# Patient Record
Sex: MALE | URBAN - METROPOLITAN AREA
[De-identification: names, ages, dates, MRNs, and addresses within clinical notes are randomized per-mention and may not be internally consistent; named-entity substitution may affect disease eponyms.]

---

## 2022-01-01 ENCOUNTER — HOSPITAL ENCOUNTER (OUTPATIENT)
Dept: RADIOLOGY | Facility: MEDICAL CENTER | Age: 62
End: 2022-08-01
Payer: COMMERCIAL

## 2022-01-01 ENCOUNTER — APPOINTMENT (OUTPATIENT)
Dept: RADIOLOGY | Facility: MEDICAL CENTER | Age: 62
DRG: 871 | End: 2022-01-01
Attending: INTERNAL MEDICINE
Payer: COMMERCIAL

## 2022-01-01 ENCOUNTER — APPOINTMENT (OUTPATIENT)
Dept: RADIOLOGY | Facility: MEDICAL CENTER | Age: 62
DRG: 871 | End: 2022-01-01
Attending: STUDENT IN AN ORGANIZED HEALTH CARE EDUCATION/TRAINING PROGRAM
Payer: COMMERCIAL

## 2022-01-01 ENCOUNTER — APPOINTMENT (OUTPATIENT)
Dept: CARDIOLOGY | Facility: MEDICAL CENTER | Age: 62
DRG: 871 | End: 2022-01-01
Attending: STUDENT IN AN ORGANIZED HEALTH CARE EDUCATION/TRAINING PROGRAM
Payer: COMMERCIAL

## 2022-01-01 ENCOUNTER — APPOINTMENT (OUTPATIENT)
Dept: CARDIOLOGY | Facility: MEDICAL CENTER | Age: 62
DRG: 871 | End: 2022-01-01
Payer: COMMERCIAL

## 2022-01-01 ENCOUNTER — HOSPITAL ENCOUNTER (INPATIENT)
Facility: MEDICAL CENTER | Age: 62
LOS: 3 days | DRG: 871 | End: 2022-08-04
Attending: STUDENT IN AN ORGANIZED HEALTH CARE EDUCATION/TRAINING PROGRAM | Admitting: STUDENT IN AN ORGANIZED HEALTH CARE EDUCATION/TRAINING PROGRAM
Payer: COMMERCIAL

## 2022-01-01 VITALS
HEART RATE: 84 BPM | BODY MASS INDEX: 29.79 KG/M2 | OXYGEN SATURATION: 97 % | SYSTOLIC BLOOD PRESSURE: 96 MMHG | HEIGHT: 70 IN | DIASTOLIC BLOOD PRESSURE: 54 MMHG | WEIGHT: 208.11 LBS | TEMPERATURE: 96.8 F

## 2022-01-01 DIAGNOSIS — R65.21 SEPTIC SHOCK (HCC): ICD-10-CM

## 2022-01-01 DIAGNOSIS — G06.1 ABSCESS IN EPIDURAL SPACE OF THORACIC SPINE: ICD-10-CM

## 2022-01-01 DIAGNOSIS — A41.9 SEPTIC SHOCK (HCC): ICD-10-CM

## 2022-01-01 LAB
ALBUMIN SERPL BCP-MCNC: 1.7 G/DL (ref 3.2–4.9)
ALBUMIN SERPL BCP-MCNC: 2 G/DL (ref 3.2–4.9)
ALBUMIN/GLOB SERPL: 0.4 G/DL
ALBUMIN/GLOB SERPL: 0.4 G/DL
ALP SERPL-CCNC: 101 U/L (ref 30–99)
ALP SERPL-CCNC: 106 U/L (ref 30–99)
ALT SERPL-CCNC: 14 U/L (ref 2–50)
ALT SERPL-CCNC: 19 U/L (ref 2–50)
AMMONIA PLAS-SCNC: 58 UMOL/L (ref 11–45)
ANION GAP SERPL CALC-SCNC: 15 MMOL/L (ref 7–16)
ANION GAP SERPL CALC-SCNC: 15 MMOL/L (ref 7–16)
ANION GAP SERPL CALC-SCNC: 19 MMOL/L (ref 7–16)
ANION GAP SERPL CALC-SCNC: 19 MMOL/L (ref 7–16)
ANION GAP SERPL CALC-SCNC: 20 MMOL/L (ref 7–16)
ANISOCYTOSIS BLD QL SMEAR: ABNORMAL
APTT PPP: 36.3 SEC (ref 24.7–36)
AST SERPL-CCNC: 33 U/L (ref 12–45)
AST SERPL-CCNC: 44 U/L (ref 12–45)
BACTERIA BLD CULT: ABNORMAL
BASE EXCESS BLDA CALC-SCNC: -11 MMOL/L (ref -4–3)
BASE EXCESS BLDA CALC-SCNC: -4 MMOL/L (ref -4–3)
BASOPHILS # BLD AUTO: 0 % (ref 0–1.8)
BASOPHILS # BLD AUTO: 0.4 % (ref 0–1.8)
BASOPHILS # BLD AUTO: 0.6 % (ref 0–1.8)
BASOPHILS # BLD AUTO: 0.7 % (ref 0–1.8)
BASOPHILS # BLD: 0 K/UL (ref 0–0.12)
BASOPHILS # BLD: 0.07 K/UL (ref 0–0.12)
BASOPHILS # BLD: 0.13 K/UL (ref 0–0.12)
BASOPHILS # BLD: 0.16 K/UL (ref 0–0.12)
BILIRUB SERPL-MCNC: 1.6 MG/DL (ref 0.1–1.5)
BILIRUB SERPL-MCNC: 1.7 MG/DL (ref 0.1–1.5)
BODY TEMPERATURE: 36.6 CENTIGRADE
BODY TEMPERATURE: ABNORMAL DEGREES
BUN SERPL-MCNC: 54 MG/DL (ref 8–22)
BUN SERPL-MCNC: 60 MG/DL (ref 8–22)
BUN SERPL-MCNC: 70 MG/DL (ref 8–22)
BUN SERPL-MCNC: 80 MG/DL (ref 8–22)
BUN SERPL-MCNC: 94 MG/DL (ref 8–22)
BURR CELLS BLD QL SMEAR: NORMAL
CA-I SERPL-SCNC: 1.1 MMOL/L (ref 1.1–1.3)
CALCIUM SERPL-MCNC: 6.8 MG/DL (ref 8.5–10.5)
CALCIUM SERPL-MCNC: 6.8 MG/DL (ref 8.5–10.5)
CALCIUM SERPL-MCNC: 7.2 MG/DL (ref 8.5–10.5)
CALCIUM SERPL-MCNC: 7.3 MG/DL (ref 8.5–10.5)
CALCIUM SERPL-MCNC: 7.4 MG/DL (ref 8.5–10.5)
CFT BLD TEG: 7.2 MIN (ref 4.6–9.1)
CFT P HPASE BLD TEG: 6.5 MIN (ref 4.3–8.3)
CHLORIDE SERPL-SCNC: 100 MMOL/L (ref 96–112)
CHLORIDE SERPL-SCNC: 102 MMOL/L (ref 96–112)
CHLORIDE SERPL-SCNC: 97 MMOL/L (ref 96–112)
CHLORIDE SERPL-SCNC: 98 MMOL/L (ref 96–112)
CHLORIDE SERPL-SCNC: 99 MMOL/L (ref 96–112)
CK SERPL-CCNC: 45 U/L (ref 0–154)
CLOT ANGLE BLD TEG: 75.1 DEGREES (ref 63–78)
CO2 BLDA-SCNC: 17 MMOL/L (ref 20–33)
CO2 SERPL-SCNC: 12 MMOL/L (ref 20–33)
CO2 SERPL-SCNC: 13 MMOL/L (ref 20–33)
CO2 SERPL-SCNC: 15 MMOL/L (ref 20–33)
CO2 SERPL-SCNC: 15 MMOL/L (ref 20–33)
CO2 SERPL-SCNC: 16 MMOL/L (ref 20–33)
CORTIS SERPL-MCNC: 44.6 UG/DL (ref 0–23)
CREAT SERPL-MCNC: 1.26 MG/DL (ref 0.5–1.4)
CREAT SERPL-MCNC: 1.45 MG/DL (ref 0.5–1.4)
CREAT SERPL-MCNC: 1.81 MG/DL (ref 0.5–1.4)
CREAT SERPL-MCNC: 2.2 MG/DL (ref 0.5–1.4)
CREAT SERPL-MCNC: 2.96 MG/DL (ref 0.5–1.4)
CT.EXTRINSIC BLD ROTEM: 1.1 MIN (ref 0.8–2.1)
EKG IMPRESSION: NORMAL
EKG IMPRESSION: NORMAL
EOSINOPHIL # BLD AUTO: 0 K/UL (ref 0–0.51)
EOSINOPHIL # BLD AUTO: 0.02 K/UL (ref 0–0.51)
EOSINOPHIL # BLD AUTO: 0.02 K/UL (ref 0–0.51)
EOSINOPHIL # BLD AUTO: 0.04 K/UL (ref 0–0.51)
EOSINOPHIL NFR BLD: 0 % (ref 0–6.9)
EOSINOPHIL NFR BLD: 0.1 % (ref 0–6.9)
EOSINOPHIL NFR BLD: 0.1 % (ref 0–6.9)
EOSINOPHIL NFR BLD: 0.2 % (ref 0–6.9)
ERYTHROCYTE [DISTWIDTH] IN BLOOD BY AUTOMATED COUNT: 51.8 FL (ref 35.9–50)
ERYTHROCYTE [DISTWIDTH] IN BLOOD BY AUTOMATED COUNT: 53.8 FL (ref 35.9–50)
ERYTHROCYTE [DISTWIDTH] IN BLOOD BY AUTOMATED COUNT: 54.2 FL (ref 35.9–50)
ERYTHROCYTE [DISTWIDTH] IN BLOOD BY AUTOMATED COUNT: 54.5 FL (ref 35.9–50)
EST. AVERAGE GLUCOSE BLD GHB EST-MCNC: 163 MG/DL
FIBRINOGEN PPP-MCNC: 542 MG/DL (ref 215–460)
GFR SERPLBLD CREATININE-BSD FMLA CKD-EPI: 23 ML/MIN/1.73 M 2
GFR SERPLBLD CREATININE-BSD FMLA CKD-EPI: 33 ML/MIN/1.73 M 2
GFR SERPLBLD CREATININE-BSD FMLA CKD-EPI: 42 ML/MIN/1.73 M 2
GFR SERPLBLD CREATININE-BSD FMLA CKD-EPI: 55 ML/MIN/1.73 M 2
GFR SERPLBLD CREATININE-BSD FMLA CKD-EPI: 65 ML/MIN/1.73 M 2
GLOBULIN SER CALC-MCNC: 4.3 G/DL (ref 1.9–3.5)
GLOBULIN SER CALC-MCNC: 4.6 G/DL (ref 1.9–3.5)
GLUCOSE BLD STRIP.AUTO-MCNC: 116 MG/DL (ref 65–99)
GLUCOSE BLD STRIP.AUTO-MCNC: 125 MG/DL (ref 65–99)
GLUCOSE BLD STRIP.AUTO-MCNC: 146 MG/DL (ref 65–99)
GLUCOSE BLD STRIP.AUTO-MCNC: 151 MG/DL (ref 65–99)
GLUCOSE BLD STRIP.AUTO-MCNC: 171 MG/DL (ref 65–99)
GLUCOSE BLD STRIP.AUTO-MCNC: 171 MG/DL (ref 65–99)
GLUCOSE BLD STRIP.AUTO-MCNC: 173 MG/DL (ref 65–99)
GLUCOSE BLD STRIP.AUTO-MCNC: 186 MG/DL (ref 65–99)
GLUCOSE BLD STRIP.AUTO-MCNC: 190 MG/DL (ref 65–99)
GLUCOSE SERPL-MCNC: 104 MG/DL (ref 65–99)
GLUCOSE SERPL-MCNC: 142 MG/DL (ref 65–99)
GLUCOSE SERPL-MCNC: 170 MG/DL (ref 65–99)
GLUCOSE SERPL-MCNC: 189 MG/DL (ref 65–99)
GLUCOSE SERPL-MCNC: 206 MG/DL (ref 65–99)
HAV IGM SERPL QL IA: NORMAL
HBA1C MFR BLD: 7.3 % (ref 4–5.6)
HBV CORE IGM SER QL: NORMAL
HBV SURFACE AG SER QL: NORMAL
HCO3 BLDA-SCNC: 16 MMOL/L (ref 17–25)
HCO3 BLDA-SCNC: 19 MMOL/L (ref 17–25)
HCT VFR BLD AUTO: 38.4 % (ref 42–52)
HCT VFR BLD AUTO: 39.5 % (ref 42–52)
HCT VFR BLD AUTO: 40 % (ref 42–52)
HCT VFR BLD AUTO: 40.9 % (ref 42–52)
HCV AB SER QL: NORMAL
HGB BLD-MCNC: 13.2 G/DL (ref 14–18)
HGB BLD-MCNC: 13.2 G/DL (ref 14–18)
HGB BLD-MCNC: 13.4 G/DL (ref 14–18)
HGB BLD-MCNC: 13.4 G/DL (ref 14–18)
IMM GRANULOCYTES # BLD AUTO: 0.29 K/UL (ref 0–0.11)
IMM GRANULOCYTES # BLD AUTO: 0.3 K/UL (ref 0–0.11)
IMM GRANULOCYTES # BLD AUTO: 0.54 K/UL (ref 0–0.11)
IMM GRANULOCYTES NFR BLD AUTO: 1.4 % (ref 0–0.9)
IMM GRANULOCYTES NFR BLD AUTO: 1.6 % (ref 0–0.9)
IMM GRANULOCYTES NFR BLD AUTO: 2.3 % (ref 0–0.9)
INR PPP: 3.59 (ref 0.87–1.13)
INR PPP: 3.6 (ref 0.87–1.13)
LACTATE BLD-SCNC: 1.9 MMOL/L (ref 0.5–2)
LACTATE SERPL-SCNC: 2.2 MMOL/L (ref 0.5–2)
LACTATE SERPL-SCNC: 2.6 MMOL/L (ref 0.5–2)
LACTATE SERPL-SCNC: 2.9 MMOL/L (ref 0.5–2)
LACTATE SERPL-SCNC: 3 MMOL/L (ref 0.5–2)
LV EJECT FRACT  99904: 25
LV EJECT FRACT MOD 2C 99903: 45.85
LV EJECT FRACT MOD 4C 99902: 46.82
LV EJECT FRACT MOD BP 99901: 49.11
LYMPHOCYTES # BLD AUTO: 0 K/UL (ref 1–4.8)
LYMPHOCYTES # BLD AUTO: 0.41 K/UL (ref 1–4.8)
LYMPHOCYTES # BLD AUTO: 0.49 K/UL (ref 1–4.8)
LYMPHOCYTES # BLD AUTO: 0.53 K/UL (ref 1–4.8)
LYMPHOCYTES NFR BLD: 0 % (ref 22–41)
LYMPHOCYTES NFR BLD: 1.7 % (ref 22–41)
LYMPHOCYTES NFR BLD: 2.5 % (ref 22–41)
LYMPHOCYTES NFR BLD: 2.6 % (ref 22–41)
MACROCYTES BLD QL SMEAR: ABNORMAL
MAGNESIUM SERPL-MCNC: 2 MG/DL (ref 1.5–2.5)
MAGNESIUM SERPL-MCNC: 2.3 MG/DL (ref 1.5–2.5)
MANUAL DIFF BLD: NORMAL
MCF BLD TEG: 64.8 MM (ref 52–69)
MCF.PLATELET INHIB BLD ROTEM: 26.6 MM (ref 15–32)
MCH RBC QN AUTO: 27.1 PG (ref 27–33)
MCH RBC QN AUTO: 27.3 PG (ref 27–33)
MCH RBC QN AUTO: 27.5 PG (ref 27–33)
MCH RBC QN AUTO: 27.5 PG (ref 27–33)
MCHC RBC AUTO-ENTMCNC: 32.8 G/DL (ref 33.7–35.3)
MCHC RBC AUTO-ENTMCNC: 33.4 G/DL (ref 33.7–35.3)
MCHC RBC AUTO-ENTMCNC: 33.5 G/DL (ref 33.7–35.3)
MCHC RBC AUTO-ENTMCNC: 34.4 G/DL (ref 33.7–35.3)
MCV RBC AUTO: 80 FL (ref 81.4–97.8)
MCV RBC AUTO: 81.5 FL (ref 81.4–97.8)
MCV RBC AUTO: 82.3 FL (ref 81.4–97.8)
MCV RBC AUTO: 82.8 FL (ref 81.4–97.8)
MONOCYTES # BLD AUTO: 0.19 K/UL (ref 0–0.85)
MONOCYTES # BLD AUTO: 0.59 K/UL (ref 0–0.85)
MONOCYTES # BLD AUTO: 0.74 K/UL (ref 0–0.85)
MONOCYTES # BLD AUTO: 0.81 K/UL (ref 0–0.85)
MONOCYTES NFR BLD AUTO: 0.8 % (ref 0–13.4)
MONOCYTES NFR BLD AUTO: 3.1 % (ref 0–13.4)
MONOCYTES NFR BLD AUTO: 3.1 % (ref 0–13.4)
MONOCYTES NFR BLD AUTO: 3.9 % (ref 0–13.4)
MORPHOLOGY BLD-IMP: NORMAL
NEUTROPHILS # BLD AUTO: 17.63 K/UL (ref 1.82–7.42)
NEUTROPHILS # BLD AUTO: 19.19 K/UL (ref 1.82–7.42)
NEUTROPHILS # BLD AUTO: 21.8 K/UL (ref 1.82–7.42)
NEUTROPHILS # BLD AUTO: 23.81 K/UL (ref 1.82–7.42)
NEUTROPHILS NFR BLD: 91.5 % (ref 44–72)
NEUTROPHILS NFR BLD: 92.1 % (ref 44–72)
NEUTROPHILS NFR BLD: 92.1 % (ref 44–72)
NEUTROPHILS NFR BLD: 94.9 % (ref 44–72)
NEUTS BAND NFR BLD MANUAL: 4.3 % (ref 0–10)
NRBC # BLD AUTO: 0.04 K/UL
NRBC # BLD AUTO: 0.1 K/UL
NRBC # BLD AUTO: 0.11 K/UL
NRBC # BLD AUTO: 0.11 K/UL
NRBC BLD-RTO: 0.2 /100 WBC
NRBC BLD-RTO: 0.5 /100 WBC
NRBC BLD-RTO: 0.5 /100 WBC
NRBC BLD-RTO: 0.6 /100 WBC
PA AA BLD-ACNC: 48.3 % (ref 0–11)
PA ADP BLD-ACNC: 67.9 % (ref 0–17)
PCO2 BLDA: 28.2 MMHG (ref 26–37)
PCO2 BLDA: 37.9 MMHG (ref 26–37)
PCO2 TEMP ADJ BLDA: 36.1 MMHG (ref 26–37)
PH BLDA: 7.23 [PH] (ref 7.4–7.5)
PH BLDA: 7.44 [PH] (ref 7.4–7.5)
PH TEMP ADJ BLDA: 7.25 [PH] (ref 7.4–7.5)
PLATELET # BLD AUTO: 141 K/UL (ref 164–446)
PLATELET # BLD AUTO: 171 K/UL (ref 164–446)
PLATELET # BLD AUTO: 186 K/UL (ref 164–446)
PLATELET # BLD AUTO: 194 K/UL (ref 164–446)
PLATELET BLD QL SMEAR: NORMAL
PMV BLD AUTO: 10.2 FL (ref 9–12.9)
PMV BLD AUTO: 10.3 FL (ref 9–12.9)
PMV BLD AUTO: 10.8 FL (ref 9–12.9)
PMV BLD AUTO: 9.9 FL (ref 9–12.9)
PO2 BLDA: 83 MMHG (ref 64–87)
PO2 BLDA: 92 MMHG (ref 64–87)
PO2 TEMP ADJ BLDA: 86 MMHG (ref 64–87)
POIKILOCYTOSIS BLD QL SMEAR: NORMAL
POTASSIUM SERPL-SCNC: 3.9 MMOL/L (ref 3.6–5.5)
POTASSIUM SERPL-SCNC: 4.5 MMOL/L (ref 3.6–5.5)
POTASSIUM SERPL-SCNC: 4.5 MMOL/L (ref 3.6–5.5)
POTASSIUM SERPL-SCNC: 5.1 MMOL/L (ref 3.6–5.5)
POTASSIUM SERPL-SCNC: 5.3 MMOL/L (ref 3.6–5.5)
PROCALCITONIN SERPL-MCNC: 2.43 NG/ML
PROT SERPL-MCNC: 6 G/DL (ref 6–8.2)
PROT SERPL-MCNC: 6.6 G/DL (ref 6–8.2)
PROTHROMBIN TIME: 34.5 SEC (ref 12–14.6)
PROTHROMBIN TIME: 34.5 SEC (ref 12–14.6)
RBC # BLD AUTO: 4.8 M/UL (ref 4.7–6.1)
RBC # BLD AUTO: 4.8 M/UL (ref 4.7–6.1)
RBC # BLD AUTO: 4.91 M/UL (ref 4.7–6.1)
RBC # BLD AUTO: 4.94 M/UL (ref 4.7–6.1)
RBC BLD AUTO: PRESENT
SAO2 % BLDA: 95 % (ref 93–99)
SAO2 % BLDA: 96 % (ref 93–99)
SCCMEC + MECA PNL NOSE NAA+PROBE: POSITIVE
SIGNIFICANT IND 70042: ABNORMAL
SIGNIFICANT IND 70042: ABNORMAL
SITE SITE: ABNORMAL
SITE SITE: ABNORMAL
SODIUM SERPL-SCNC: 130 MMOL/L (ref 135–145)
SODIUM SERPL-SCNC: 132 MMOL/L (ref 135–145)
SODIUM SERPL-SCNC: 133 MMOL/L (ref 135–145)
SOURCE SOURCE: ABNORMAL
SOURCE SOURCE: ABNORMAL
SPECIMEN DRAWN FROM PATIENT: ABNORMAL
TEG ALGORITHM TGALG: ABNORMAL
TROPONIN T SERPL-MCNC: 63 NG/L (ref 6–19)
WBC # BLD AUTO: 19.1 K/UL (ref 4.8–10.8)
WBC # BLD AUTO: 21 K/UL (ref 4.8–10.8)
WBC # BLD AUTO: 23.7 K/UL (ref 4.8–10.8)
WBC # BLD AUTO: 24 K/UL (ref 4.8–10.8)

## 2022-01-01 PROCEDURE — 93005 ELECTROCARDIOGRAM TRACING: CPT | Performed by: STUDENT IN AN ORGANIZED HEALTH CARE EDUCATION/TRAINING PROGRAM

## 2022-01-01 PROCEDURE — 700101 HCHG RX REV CODE 250: Performed by: STUDENT IN AN ORGANIZED HEALTH CARE EDUCATION/TRAINING PROGRAM

## 2022-01-01 PROCEDURE — 99291 CRITICAL CARE FIRST HOUR: CPT | Mod: 25 | Performed by: INTERNAL MEDICINE

## 2022-01-01 PROCEDURE — 99291 CRITICAL CARE FIRST HOUR: CPT | Performed by: INTERNAL MEDICINE

## 2022-01-01 PROCEDURE — 700105 HCHG RX REV CODE 258: Performed by: INTERNAL MEDICINE

## 2022-01-01 PROCEDURE — 83605 ASSAY OF LACTIC ACID: CPT

## 2022-01-01 PROCEDURE — 700105 HCHG RX REV CODE 258

## 2022-01-01 PROCEDURE — 93010 ELECTROCARDIOGRAM REPORT: CPT | Performed by: INTERNAL MEDICINE

## 2022-01-01 PROCEDURE — 37799 UNLISTED PX VASCULAR SURGERY: CPT

## 2022-01-01 PROCEDURE — 92960 CARDIOVERSION ELECTRIC EXT: CPT

## 2022-01-01 PROCEDURE — 87641 MR-STAPH DNA AMP PROBE: CPT

## 2022-01-01 PROCEDURE — 99233 SBSQ HOSP IP/OBS HIGH 50: CPT | Performed by: INTERNAL MEDICINE

## 2022-01-01 PROCEDURE — 700111 HCHG RX REV CODE 636 W/ 250 OVERRIDE (IP)

## 2022-01-01 PROCEDURE — 700105 HCHG RX REV CODE 258: Performed by: STUDENT IN AN ORGANIZED HEALTH CARE EDUCATION/TRAINING PROGRAM

## 2022-01-01 PROCEDURE — 770022 HCHG ROOM/CARE - ICU (200)

## 2022-01-01 PROCEDURE — 85007 BL SMEAR W/DIFF WBC COUNT: CPT

## 2022-01-01 PROCEDURE — 700101 HCHG RX REV CODE 250: Performed by: INTERNAL MEDICINE

## 2022-01-01 PROCEDURE — 85384 FIBRINOGEN ACTIVITY: CPT

## 2022-01-01 PROCEDURE — 700111 HCHG RX REV CODE 636 W/ 250 OVERRIDE (IP): Performed by: INTERNAL MEDICINE

## 2022-01-01 PROCEDURE — 700111 HCHG RX REV CODE 636 W/ 250 OVERRIDE (IP): Performed by: STUDENT IN AN ORGANIZED HEALTH CARE EDUCATION/TRAINING PROGRAM

## 2022-01-01 PROCEDURE — 84484 ASSAY OF TROPONIN QUANT: CPT

## 2022-01-01 PROCEDURE — 99238 HOSP IP/OBS DSCHRG MGMT 30/<: CPT | Performed by: INTERNAL MEDICINE

## 2022-01-01 PROCEDURE — 02HV33Z INSERTION OF INFUSION DEVICE INTO SUPERIOR VENA CAVA, PERCUTANEOUS APPROACH: ICD-10-PCS | Performed by: STUDENT IN AN ORGANIZED HEALTH CARE EDUCATION/TRAINING PROGRAM

## 2022-01-01 PROCEDURE — C1751 CATH, INF, PER/CENT/MIDLINE: HCPCS

## 2022-01-01 PROCEDURE — A9270 NON-COVERED ITEM OR SERVICE: HCPCS

## 2022-01-01 PROCEDURE — 700102 HCHG RX REV CODE 250 W/ 637 OVERRIDE(OP)

## 2022-01-01 PROCEDURE — 85610 PROTHROMBIN TIME: CPT

## 2022-01-01 PROCEDURE — 80048 BASIC METABOLIC PNL TOTAL CA: CPT

## 2022-01-01 PROCEDURE — 82962 GLUCOSE BLOOD TEST: CPT

## 2022-01-01 PROCEDURE — 85730 THROMBOPLASTIN TIME PARTIAL: CPT

## 2022-01-01 PROCEDURE — 31720 CLEARANCE OF AIRWAYS: CPT

## 2022-01-01 PROCEDURE — 80053 COMPREHEN METABOLIC PANEL: CPT

## 2022-01-01 PROCEDURE — 76705 ECHO EXAM OF ABDOMEN: CPT

## 2022-01-01 PROCEDURE — 85025 COMPLETE CBC W/AUTO DIFF WBC: CPT

## 2022-01-01 PROCEDURE — 700117 HCHG RX CONTRAST REV CODE 255: Performed by: STUDENT IN AN ORGANIZED HEALTH CARE EDUCATION/TRAINING PROGRAM

## 2022-01-01 PROCEDURE — 36620 INSERTION CATHETER ARTERY: CPT | Performed by: INTERNAL MEDICINE

## 2022-01-01 PROCEDURE — 700111 HCHG RX REV CODE 636 W/ 250 OVERRIDE (IP): Performed by: NURSE PRACTITIONER

## 2022-01-01 PROCEDURE — A9270 NON-COVERED ITEM OR SERVICE: HCPCS | Performed by: STUDENT IN AN ORGANIZED HEALTH CARE EDUCATION/TRAINING PROGRAM

## 2022-01-01 PROCEDURE — 99292 CRITICAL CARE ADDL 30 MIN: CPT | Performed by: INTERNAL MEDICINE

## 2022-01-01 PROCEDURE — 85347 COAGULATION TIME ACTIVATED: CPT

## 2022-01-01 PROCEDURE — 87150 DNA/RNA AMPLIFIED PROBE: CPT | Mod: 91

## 2022-01-01 PROCEDURE — 82533 TOTAL CORTISOL: CPT

## 2022-01-01 PROCEDURE — 83735 ASSAY OF MAGNESIUM: CPT

## 2022-01-01 PROCEDURE — 36556 INSERT NON-TUNNEL CV CATH: CPT | Mod: RT | Performed by: STUDENT IN AN ORGANIZED HEALTH CARE EDUCATION/TRAINING PROGRAM

## 2022-01-01 PROCEDURE — 03HY32Z INSERTION OF MONITORING DEVICE INTO UPPER ARTERY, PERCUTANEOUS APPROACH: ICD-10-PCS | Performed by: INTERNAL MEDICINE

## 2022-01-01 PROCEDURE — 87186 SC STD MICRODIL/AGAR DIL: CPT

## 2022-01-01 PROCEDURE — 93306 TTE W/DOPPLER COMPLETE: CPT

## 2022-01-01 PROCEDURE — 82330 ASSAY OF CALCIUM: CPT

## 2022-01-01 PROCEDURE — 99255 IP/OBS CONSLTJ NEW/EST HI 80: CPT | Performed by: INTERNAL MEDICINE

## 2022-01-01 PROCEDURE — 82140 ASSAY OF AMMONIA: CPT

## 2022-01-01 PROCEDURE — 99152 MOD SED SAME PHYS/QHP 5/>YRS: CPT

## 2022-01-01 PROCEDURE — 85576 BLOOD PLATELET AGGREGATION: CPT

## 2022-01-01 PROCEDURE — 87077 CULTURE AEROBIC IDENTIFY: CPT

## 2022-01-01 PROCEDURE — 93306 TTE W/DOPPLER COMPLETE: CPT | Mod: 26 | Performed by: INTERNAL MEDICINE

## 2022-01-01 PROCEDURE — 700102 HCHG RX REV CODE 250 W/ 637 OVERRIDE(OP): Performed by: STUDENT IN AN ORGANIZED HEALTH CARE EDUCATION/TRAINING PROGRAM

## 2022-01-01 PROCEDURE — 83036 HEMOGLOBIN GLYCOSYLATED A1C: CPT

## 2022-01-01 PROCEDURE — 84145 PROCALCITONIN (PCT): CPT

## 2022-01-01 PROCEDURE — 71045 X-RAY EXAM CHEST 1 VIEW: CPT

## 2022-01-01 PROCEDURE — 99292 CRITICAL CARE ADDL 30 MIN: CPT | Mod: 25,GC | Performed by: STUDENT IN AN ORGANIZED HEALTH CARE EDUCATION/TRAINING PROGRAM

## 2022-01-01 PROCEDURE — 82550 ASSAY OF CK (CPK): CPT

## 2022-01-01 PROCEDURE — 82803 BLOOD GASES ANY COMBINATION: CPT

## 2022-01-01 PROCEDURE — 99291 CRITICAL CARE FIRST HOUR: CPT | Mod: GC | Performed by: STUDENT IN AN ORGANIZED HEALTH CARE EDUCATION/TRAINING PROGRAM

## 2022-01-01 PROCEDURE — 74176 CT ABD & PELVIS W/O CONTRAST: CPT

## 2022-01-01 PROCEDURE — 36556 INSERT NON-TUNNEL CV CATH: CPT

## 2022-01-01 PROCEDURE — 99223 1ST HOSP IP/OBS HIGH 75: CPT | Mod: AI,GC | Performed by: STUDENT IN AN ORGANIZED HEALTH CARE EDUCATION/TRAINING PROGRAM

## 2022-01-01 PROCEDURE — 80074 ACUTE HEPATITIS PANEL: CPT

## 2022-01-01 PROCEDURE — 93010 ELECTROCARDIOGRAM REPORT: CPT | Mod: 77 | Performed by: INTERNAL MEDICINE

## 2022-01-01 PROCEDURE — 99292 CRITICAL CARE ADDL 30 MIN: CPT | Mod: GC | Performed by: STUDENT IN AN ORGANIZED HEALTH CARE EDUCATION/TRAINING PROGRAM

## 2022-01-01 PROCEDURE — 87040 BLOOD CULTURE FOR BACTERIA: CPT | Mod: 91

## 2022-01-01 PROCEDURE — 700101 HCHG RX REV CODE 250

## 2022-01-01 RX ORDER — SODIUM CHLORIDE, SODIUM LACTATE, POTASSIUM CHLORIDE, AND CALCIUM CHLORIDE .6; .31; .03; .02 G/100ML; G/100ML; G/100ML; G/100ML
500 INJECTION, SOLUTION INTRAVENOUS ONCE
Status: COMPLETED | OUTPATIENT
Start: 2022-01-01 | End: 2022-01-01

## 2022-01-01 RX ORDER — MORPHINE SULFATE 4 MG/ML
4 INJECTION INTRAVENOUS EVERY 4 HOURS PRN
Status: DISCONTINUED | OUTPATIENT
Start: 2022-01-01 | End: 2022-01-01 | Stop reason: HOSPADM

## 2022-01-01 RX ORDER — PRAVASTATIN SODIUM 20 MG
40 TABLET ORAL EVERY EVENING
Status: DISCONTINUED | OUTPATIENT
Start: 2022-01-01 | End: 2022-01-01

## 2022-01-01 RX ORDER — HYDROMORPHONE HYDROCHLORIDE 2 MG/ML
2 INJECTION, SOLUTION INTRAMUSCULAR; INTRAVENOUS; SUBCUTANEOUS ONCE
Status: COMPLETED | OUTPATIENT
Start: 2022-01-01 | End: 2022-01-01

## 2022-01-01 RX ORDER — DEXTROSE MONOHYDRATE 25 G/50ML
25 INJECTION, SOLUTION INTRAVENOUS
Status: DISCONTINUED | OUTPATIENT
Start: 2022-01-01 | End: 2022-01-01

## 2022-01-01 RX ORDER — SODIUM CHLORIDE 9 MG/ML
INJECTION, SOLUTION INTRAVENOUS CONTINUOUS
Status: DISCONTINUED | OUTPATIENT
Start: 2022-01-01 | End: 2022-01-01

## 2022-01-01 RX ORDER — OXYCODONE HYDROCHLORIDE 5 MG/1
5 TABLET ORAL EVERY 4 HOURS PRN
Status: DISCONTINUED | OUTPATIENT
Start: 2022-01-01 | End: 2022-01-01 | Stop reason: HOSPADM

## 2022-01-01 RX ORDER — FUROSEMIDE 10 MG/ML
80 INJECTION INTRAMUSCULAR; INTRAVENOUS ONCE
Status: COMPLETED | OUTPATIENT
Start: 2022-01-01 | End: 2022-01-01

## 2022-01-01 RX ORDER — LACTULOSE 20 G/30ML
30 SOLUTION ORAL 3 TIMES DAILY
Status: DISCONTINUED | OUTPATIENT
Start: 2022-01-01 | End: 2022-01-01

## 2022-01-01 RX ORDER — POLYETHYLENE GLYCOL 3350 17 G/17G
1 POWDER, FOR SOLUTION ORAL
Status: DISCONTINUED | OUTPATIENT
Start: 2022-01-01 | End: 2022-01-01 | Stop reason: HOSPADM

## 2022-01-01 RX ORDER — AMOXICILLIN 250 MG
2 CAPSULE ORAL 2 TIMES DAILY
Status: DISCONTINUED | OUTPATIENT
Start: 2022-01-01 | End: 2022-01-01

## 2022-01-01 RX ORDER — NOREPINEPHRINE BITARTRATE 0.03 MG/ML
INJECTION, SOLUTION INTRAVENOUS
Status: COMPLETED
Start: 2022-01-01 | End: 2022-01-01

## 2022-01-01 RX ORDER — MIDODRINE HYDROCHLORIDE 5 MG/1
10 TABLET ORAL
Status: DISCONTINUED | OUTPATIENT
Start: 2022-01-01 | End: 2022-01-01

## 2022-01-01 RX ORDER — MIDAZOLAM HYDROCHLORIDE 1 MG/ML
INJECTION INTRAMUSCULAR; INTRAVENOUS
Status: ACTIVE
Start: 2022-01-01 | End: 2022-01-01

## 2022-01-01 RX ORDER — SODIUM CHLORIDE, SODIUM LACTATE, POTASSIUM CHLORIDE, AND CALCIUM CHLORIDE .6; .31; .03; .02 G/100ML; G/100ML; G/100ML; G/100ML
500 INJECTION, SOLUTION INTRAVENOUS ONCE
Status: DISCONTINUED | OUTPATIENT
Start: 2022-01-01 | End: 2022-01-01

## 2022-01-01 RX ORDER — FUROSEMIDE 10 MG/ML
100 INJECTION INTRAMUSCULAR; INTRAVENOUS ONCE
Status: COMPLETED | OUTPATIENT
Start: 2022-01-01 | End: 2022-01-01

## 2022-01-01 RX ORDER — SODIUM BICARBONATE 650 MG/1
650 TABLET ORAL EVERY 8 HOURS
Status: DISCONTINUED | OUTPATIENT
Start: 2022-01-01 | End: 2022-01-01

## 2022-01-01 RX ORDER — MIDAZOLAM HYDROCHLORIDE 1 MG/ML
2 INJECTION INTRAMUSCULAR; INTRAVENOUS ONCE
Status: COMPLETED | OUTPATIENT
Start: 2022-01-01 | End: 2022-01-01

## 2022-01-01 RX ORDER — ACETAMINOPHEN 325 MG/1
650 TABLET ORAL EVERY 4 HOURS PRN
Status: DISCONTINUED | OUTPATIENT
Start: 2022-01-01 | End: 2022-01-01

## 2022-01-01 RX ORDER — AMOXICILLIN 250 MG
2 CAPSULE ORAL 2 TIMES DAILY
Status: DISCONTINUED | OUTPATIENT
Start: 2022-01-01 | End: 2022-01-01 | Stop reason: HOSPADM

## 2022-01-01 RX ORDER — EPINEPHRINE HCL IN 0.9 % NACL 4MG/250ML
0-10 PLASTIC BAG, INJECTION (ML) INTRAVENOUS CONTINUOUS
Status: DISCONTINUED | OUTPATIENT
Start: 2022-01-01 | End: 2022-01-01

## 2022-01-01 RX ORDER — EPINEPHRINE HCL IN 0.9 % NACL 4MG/250ML
0-.2 PLASTIC BAG, INJECTION (ML) INTRAVENOUS CONTINUOUS
Status: DISCONTINUED | OUTPATIENT
Start: 2022-01-01 | End: 2022-01-01 | Stop reason: HOSPADM

## 2022-01-01 RX ORDER — OXYCODONE HYDROCHLORIDE 5 MG/1
5 TABLET ORAL EVERY 4 HOURS PRN
Status: DISCONTINUED | OUTPATIENT
Start: 2022-01-01 | End: 2022-01-01

## 2022-01-01 RX ORDER — BISACODYL 10 MG
10 SUPPOSITORY, RECTAL RECTAL
Status: DISCONTINUED | OUTPATIENT
Start: 2022-01-01 | End: 2022-01-01

## 2022-01-01 RX ORDER — MORPHINE SULFATE 4 MG/ML
2 INJECTION INTRAVENOUS EVERY 4 HOURS PRN
Status: DISCONTINUED | OUTPATIENT
Start: 2022-01-01 | End: 2022-01-01 | Stop reason: HOSPADM

## 2022-01-01 RX ORDER — ACETAMINOPHEN 325 MG/1
650 TABLET ORAL EVERY 4 HOURS PRN
Status: DISCONTINUED | OUTPATIENT
Start: 2022-01-01 | End: 2022-01-01 | Stop reason: HOSPADM

## 2022-01-01 RX ORDER — SODIUM CHLORIDE, SODIUM GLUCONATE, SODIUM ACETATE, POTASSIUM CHLORIDE AND MAGNESIUM CHLORIDE 526; 502; 368; 37; 30 MG/100ML; MG/100ML; MG/100ML; MG/100ML; MG/100ML
500 INJECTION, SOLUTION INTRAVENOUS ONCE
Status: COMPLETED | OUTPATIENT
Start: 2022-01-01 | End: 2022-01-01

## 2022-01-01 RX ORDER — BUDESONIDE AND FORMOTEROL FUMARATE DIHYDRATE 80; 4.5 UG/1; UG/1
2 AEROSOL RESPIRATORY (INHALATION)
Status: DISCONTINUED | OUTPATIENT
Start: 2022-01-01 | End: 2022-01-01 | Stop reason: HOSPADM

## 2022-01-01 RX ORDER — OMEPRAZOLE 20 MG/1
20 CAPSULE, DELAYED RELEASE ORAL DAILY
Status: DISCONTINUED | OUTPATIENT
Start: 2022-01-01 | End: 2022-01-01

## 2022-01-01 RX ORDER — NOREPINEPHRINE BITARTRATE 0.03 MG/ML
0-30 INJECTION, SOLUTION INTRAVENOUS CONTINUOUS
Status: DISCONTINUED | OUTPATIENT
Start: 2022-01-01 | End: 2022-01-01 | Stop reason: HOSPADM

## 2022-01-01 RX ORDER — POLYETHYLENE GLYCOL 3350 17 G/17G
1 POWDER, FOR SOLUTION ORAL
Status: DISCONTINUED | OUTPATIENT
Start: 2022-01-01 | End: 2022-01-01

## 2022-01-01 RX ORDER — BISACODYL 10 MG
10 SUPPOSITORY, RECTAL RECTAL
Status: DISCONTINUED | OUTPATIENT
Start: 2022-01-01 | End: 2022-01-01 | Stop reason: HOSPADM

## 2022-01-01 RX ORDER — EPINEPHRINE HCL IN 0.9 % NACL 4MG/250ML
0-.2 PLASTIC BAG, INJECTION (ML) INTRAVENOUS CONTINUOUS
Status: DISCONTINUED | OUTPATIENT
Start: 2022-01-01 | End: 2022-01-01

## 2022-01-01 RX ADMIN — NOREPINEPHRINE BITARTRATE 30 MCG/MIN: 1 INJECTION, SOLUTION, CONCENTRATE INTRAVENOUS at 18:56

## 2022-01-01 RX ADMIN — MORPHINE SULFATE 4 MG: 4 INJECTION INTRAVENOUS at 09:53

## 2022-01-01 RX ADMIN — MORPHINE SULFATE 2 MG: 4 INJECTION INTRAVENOUS at 21:28

## 2022-01-01 RX ADMIN — EPINEPHRINE 0.2 MCG/KG/MIN: 1 INJECTION INTRAMUSCULAR; INTRAVENOUS; SUBCUTANEOUS at 20:20

## 2022-01-01 RX ADMIN — EPINEPHRINE 0.12 MCG/KG/MIN: 1 INJECTION INTRAMUSCULAR; INTRAVENOUS; SUBCUTANEOUS at 07:23

## 2022-01-01 RX ADMIN — MIDODRINE HYDROCHLORIDE 10 MG: 5 TABLET ORAL at 00:14

## 2022-01-01 RX ADMIN — NOREPINEPHRINE BITARTRATE 22 MCG/MIN: 1 INJECTION, SOLUTION, CONCENTRATE INTRAVENOUS at 11:33

## 2022-01-01 RX ADMIN — SODIUM CHLORIDE, POTASSIUM CHLORIDE, SODIUM LACTATE AND CALCIUM CHLORIDE 500 ML: 600; 310; 30; 20 INJECTION, SOLUTION INTRAVENOUS at 00:35

## 2022-01-01 RX ADMIN — EPINEPHRINE 0.2 MCG/KG/MIN: 1 INJECTION INTRAMUSCULAR; INTRAVENOUS; SUBCUTANEOUS at 06:16

## 2022-01-01 RX ADMIN — INSULIN HUMAN 1 UNITS: 100 INJECTION, SOLUTION PARENTERAL at 05:44

## 2022-01-01 RX ADMIN — CHLOROTHIAZIDE SODIUM 500 MG: 500 INJECTION, POWDER, LYOPHILIZED, FOR SOLUTION INTRAVENOUS at 08:22

## 2022-01-01 RX ADMIN — NOREPINEPHRINE BITARTRATE 10 MCG/MIN: 1 INJECTION, SOLUTION, CONCENTRATE INTRAVENOUS at 01:12

## 2022-01-01 RX ADMIN — EPINEPHRINE 0.2 MCG/KG/MIN: 1 INJECTION INTRAMUSCULAR; INTRAVENOUS; SUBCUTANEOUS at 02:59

## 2022-01-01 RX ADMIN — FUROSEMIDE 80 MG: 10 INJECTION, SOLUTION INTRAMUSCULAR; INTRAVENOUS at 11:42

## 2022-01-01 RX ADMIN — FUROSEMIDE 100 MG: 10 INJECTION, SOLUTION INTRAMUSCULAR; INTRAVENOUS at 08:22

## 2022-01-01 RX ADMIN — EPINEPHRINE 1 MCG/MIN: 1 INJECTION INTRAMUSCULAR; INTRAVENOUS; SUBCUTANEOUS at 23:28

## 2022-01-01 RX ADMIN — CEFTRIAXONE SODIUM 2 G: 10 INJECTION, POWDER, FOR SOLUTION INTRAVENOUS at 11:25

## 2022-01-01 RX ADMIN — SODIUM CHLORIDE, POTASSIUM CHLORIDE, SODIUM LACTATE AND CALCIUM CHLORIDE 500 ML: 600; 310; 30; 20 INJECTION, SOLUTION INTRAVENOUS at 21:15

## 2022-01-01 RX ADMIN — EPINEPHRINE 0.2 MCG/KG/MIN: 1 INJECTION INTRAMUSCULAR; INTRAVENOUS; SUBCUTANEOUS at 21:58

## 2022-01-01 RX ADMIN — VANCOMYCIN HYDROCHLORIDE 750 MG: 500 INJECTION, POWDER, LYOPHILIZED, FOR SOLUTION INTRAVENOUS at 22:52

## 2022-01-01 RX ADMIN — EPINEPHRINE 6 MCG/MIN: 1 INJECTION INTRAMUSCULAR; INTRAVENOUS; SUBCUTANEOUS at 08:47

## 2022-01-01 RX ADMIN — VASOPRESSIN 0.03 UNITS/MIN: 20 INJECTION PARENTERAL at 01:06

## 2022-01-01 RX ADMIN — EPINEPHRINE 0.2 MCG/KG/MIN: 1 INJECTION INTRAMUSCULAR; INTRAVENOUS; SUBCUTANEOUS at 09:22

## 2022-01-01 RX ADMIN — INSULIN HUMAN 1 UNITS: 100 INJECTION, SOLUTION PARENTERAL at 00:04

## 2022-01-01 RX ADMIN — SODIUM CHLORIDE: 9 INJECTION, SOLUTION INTRAVENOUS at 02:18

## 2022-01-01 RX ADMIN — EPINEPHRINE 0.2 MCG/KG/MIN: 1 INJECTION INTRAMUSCULAR; INTRAVENOUS; SUBCUTANEOUS at 23:55

## 2022-01-01 RX ADMIN — OMEPRAZOLE 20 MG: 20 CAPSULE, DELAYED RELEASE ORAL at 07:31

## 2022-01-01 RX ADMIN — OXYCODONE 5 MG: 5 TABLET ORAL at 06:24

## 2022-01-01 RX ADMIN — NOREPINEPHRINE BITARTRATE 10 MCG/MIN: 1 INJECTION, SOLUTION, CONCENTRATE INTRAVENOUS at 01:07

## 2022-01-01 RX ADMIN — CEFTRIAXONE SODIUM 2 G: 10 INJECTION, POWDER, FOR SOLUTION INTRAVENOUS at 22:53

## 2022-01-01 RX ADMIN — EPINEPHRINE 0.14 MCG/KG/MIN: 1 INJECTION INTRAMUSCULAR; INTRAVENOUS; SUBCUTANEOUS at 13:56

## 2022-01-01 RX ADMIN — DAPTOMYCIN 750 MG: 500 INJECTION, POWDER, LYOPHILIZED, FOR SOLUTION INTRAVENOUS at 09:13

## 2022-01-01 RX ADMIN — HUMAN ALBUMIN MICROSPHERES AND PERFLUTREN 3 ML: 10; .22 INJECTION, SOLUTION INTRAVENOUS at 03:56

## 2022-01-01 RX ADMIN — EPINEPHRINE 0.2 MCG/KG/MIN: 1 INJECTION INTRAMUSCULAR; INTRAVENOUS; SUBCUTANEOUS at 20:10

## 2022-01-01 RX ADMIN — OXYCODONE 5 MG: 5 TABLET ORAL at 11:38

## 2022-01-01 RX ADMIN — MORPHINE SULFATE 4 MG: 4 INJECTION INTRAVENOUS at 01:32

## 2022-01-01 RX ADMIN — SODIUM CHLORIDE, SODIUM GLUCONATE, SODIUM ACETATE, POTASSIUM CHLORIDE AND MAGNESIUM CHLORIDE 500 ML: 526; 502; 368; 37; 30 INJECTION, SOLUTION INTRAVENOUS at 14:06

## 2022-01-01 RX ADMIN — HYDROMORPHONE HYDROCHLORIDE 2 MG: 2 INJECTION INTRAMUSCULAR; INTRAVENOUS; SUBCUTANEOUS at 16:43

## 2022-01-01 RX ADMIN — DAPTOMYCIN 750 MG: 500 INJECTION, POWDER, LYOPHILIZED, FOR SOLUTION INTRAVENOUS at 09:36

## 2022-01-01 RX ADMIN — EPINEPHRINE 0.12 MCG/KG/MIN: 1 INJECTION INTRAMUSCULAR; INTRAVENOUS; SUBCUTANEOUS at 19:30

## 2022-01-01 RX ADMIN — INSULIN HUMAN 1 UNITS: 100 INJECTION, SOLUTION PARENTERAL at 11:31

## 2022-01-01 RX ADMIN — VANCOMYCIN HYDROCHLORIDE 1750 MG: 500 INJECTION, POWDER, LYOPHILIZED, FOR SOLUTION INTRAVENOUS at 11:31

## 2022-01-01 RX ADMIN — MIDODRINE HYDROCHLORIDE 10 MG: 5 TABLET ORAL at 11:31

## 2022-01-01 RX ADMIN — MIDAZOLAM HYDROCHLORIDE 2 MG: 1 INJECTION, SOLUTION INTRAMUSCULAR; INTRAVENOUS at 16:56

## 2022-01-01 RX ADMIN — SODIUM BICARBONATE 50 MEQ: 84 INJECTION, SOLUTION INTRAVENOUS at 15:08

## 2022-01-01 RX ADMIN — MIDODRINE HYDROCHLORIDE 10 MG: 5 TABLET ORAL at 07:31

## 2022-01-01 ASSESSMENT — ENCOUNTER SYMPTOMS
DYSPNEA ON EXERTION: 0
LOSS OF CONSCIOUSNESS: 0
COUGH: 0
BLURRED VISION: 0
TINGLING: 0
PALPITATIONS: 0
WEAKNESS: 1
CONSTIPATION: 0
MEMORY LOSS: 0
MYALGIAS: 0
ABDOMINAL PAIN: 0
FOCAL WEAKNESS: 1
CHILLS: 0
HEADACHES: 0
EYE PAIN: 0
VOMITING: 0
FEVER: 0
DIZZINESS: 0
NAUSEA: 0
DIARRHEA: 0
BLOATING: 1
WHEEZING: 0
COUGH: 0
SHORTNESS OF BREATH: 0

## 2022-01-01 ASSESSMENT — PAIN DESCRIPTION - PAIN TYPE
TYPE: ACUTE PAIN

## 2022-01-01 ASSESSMENT — FIBROSIS 4 INDEX: FIB4 SCORE: 2.89

## 2022-08-01 PROBLEM — I51.3 MURAL THROMBUS OF HEART: Status: ACTIVE | Noted: 2022-01-01

## 2022-08-01 PROBLEM — E87.1 HYPO-OSMOLAR HYPONATREMIA: Status: ACTIVE | Noted: 2022-01-01

## 2022-08-01 PROBLEM — G06.2 EPIDURAL ABSCESS: Status: ACTIVE | Noted: 2022-01-01

## 2022-08-01 PROBLEM — J84.10 PULMONARY FIBROSIS (HCC): Status: ACTIVE | Noted: 2022-01-01

## 2022-08-01 PROBLEM — D64.9 NORMOCYTIC ANEMIA: Status: ACTIVE | Noted: 2022-01-01

## 2022-08-01 PROBLEM — G06.1 ABSCESS IN EPIDURAL SPACE OF THORACIC SPINE: Status: ACTIVE | Noted: 2022-01-01

## 2022-08-01 PROBLEM — E87.20 LACTIC ACIDOSIS: Status: ACTIVE | Noted: 2022-01-01

## 2022-08-01 PROBLEM — D50.9 MICROCYTIC ANEMIA: Status: RESOLVED | Noted: 2022-01-01 | Resolved: 2022-01-01

## 2022-08-01 PROBLEM — A41.9 SEPTIC SHOCK (HCC): Status: ACTIVE | Noted: 2022-01-01

## 2022-08-01 PROBLEM — R65.21 SEPTIC SHOCK (HCC): Status: ACTIVE | Noted: 2022-01-01

## 2022-08-01 PROBLEM — D50.9 MICROCYTIC ANEMIA: Status: ACTIVE | Noted: 2022-01-01

## 2022-08-01 PROBLEM — E11.9 T2DM (TYPE 2 DIABETES MELLITUS) (HCC): Status: ACTIVE | Noted: 2022-01-01

## 2022-08-02 PROBLEM — G93.40 ENCEPHALOPATHY: Status: ACTIVE | Noted: 2022-01-01

## 2022-08-02 PROBLEM — J90 PLEURAL EFFUSION, BILATERAL: Status: ACTIVE | Noted: 2022-01-01

## 2022-08-02 PROBLEM — R18.8 CIRRHOSIS OF LIVER WITH ASCITES (HCC): Status: ACTIVE | Noted: 2022-01-01

## 2022-08-02 PROBLEM — N17.9 AKI (ACUTE KIDNEY INJURY) (HCC): Status: ACTIVE | Noted: 2022-01-01

## 2022-08-02 PROBLEM — I50.20 HEART FAILURE WITH REDUCED EJECTION FRACTION (HCC): Status: ACTIVE | Noted: 2022-01-01

## 2022-08-02 PROBLEM — K74.60 CIRRHOSIS OF LIVER WITH ASCITES (HCC): Status: ACTIVE | Noted: 2022-01-01

## 2022-08-02 PROBLEM — Z71.89 GOALS OF CARE, COUNSELING/DISCUSSION: Status: ACTIVE | Noted: 2022-01-01

## 2022-08-02 PROBLEM — I33.0 ACUTE BACTERIAL ENDOCARDITIS: Status: ACTIVE | Noted: 2022-01-01

## 2022-08-02 NOTE — PROGRESS NOTES
"Pharmacy Vancomycin Kinetics Note for 8/2/2022     61 y.o. male on Vancomycin day # 1     Vancomycin Indication (Two level/Trough based Dosing): Sepsis (goal trough 15-20)    Provider specified end date: 08/15/22    Active Antibiotics (From admission, onward)    Ordered     Ordering Provider       e Aug 2, 2022  3:25 AM    08/02/22 0325  vancomycin (VANCOCIN) 1,750 mg in  mL IVPB  (vancomycin (VANCOCIN) IV (LD + Maintenance))  EVERY 12 HOURS         Santos Banda D.O.       Mon Aug 1, 2022 10:07 PM    08/01/22 2207  cefTRIAXone (Rocephin) syringe 2 g  EVERY 12 HOURS         Santos Banda D.O.       Mon Aug 1, 2022  9:20 PM    08/01/22 2120  MD Alert...Vancomycin per Pharmacy  PHARMACY TO DOSE        Question:  Indication(s) for vancomycin?  Answer:  Epidural abscess/vertebral osteomyelitis    Santos Bnada D.O.          Dosing Weight: 94.4 kg (208 lb 1.8 oz)      Admission History: Admitted on 8/1/2022 for Epidural abscess [G06.2]  Pertinent history: Pt presented to outside facility with back and RLE pain and was found to have an epidural abcess on MRI imaging. He was given 1500mg vancomycin and 4.5g of Zosyn prior to arrival. WBC upon transfer to [facility] were 24. Pt found to be tachypnic, hypotensive, and septic with infection secondary to the epidural abcess.    Allergies:     Patient has no allergy information on record.     Pertinent cultures to date:     Results     Procedure Component Value Units Date/Time    BLOOD CULTURE [[id number]] Collected: 08/01/22 2132    Order Status: Sent Specimen: Blood from Peripheral Updated: 08/01/22 2200    Narrative:      Per Hospital Policy: Only change Specimen Src: to \"Line\" if  specified by physician order.    BLOOD CULTURE [[id number]] Collected: 08/01/22 2121    Order Status: Sent Specimen: Blood from Peripheral Updated: 08/01/22 2200    Narrative:      Per Hospital Policy: Only change Specimen Src: to \"Line\" if  specified by physician order.    Blood " Culture [[id number]]     Order Status: No result Specimen: Blood from Peripheral     Blood Culture [[id number]]     Order Status: No result Specimen: Blood from Peripheral     MRSA By PCR (Amp) [[id number]]     Order Status: No result Specimen: Respirate from Nares           Labs:     CrCl cannot be calculated (Unknown ideal weight.).  Recent Labs     22   WBC 24.0*   NEUTSPOLYS 94.90*   BANDSSTABS 4.30     Recent Labs     22   BUN 54*   CREATININE 1.26   ALBUMIN 1.7*       Intake/Output Summary (Last 24 hours) at 2022 0353  Last data filed at 2022 0300  Gross per 24 hour   Intake 1528.91 ml   Output 450 ml   Net 1078.91 ml      /69   Pulse 85   Temp 36 °C (96.8 °F) (Temporal)   Resp (!) 27   Wt 94.4 kg (208 lb 1.8 oz)   SpO2 99%  Temp (24hrs), Av.2 °C (97.2 °F), Min:36 °C (96.8 °F), Max:36.6 °C (97.8 °F)      List concerns for Vancomycin clearance:     BUN/Scr ratio greater than 20:1;CHF;Pressors/Hypotension    Pharmacokinetics:     Trough kinetics:   No results for input(s): VANCOTROUGH, VANCOPEAK, VANCORANDOM in the last 72 hours.    A/P:     -  Vancomycin dose: 1750mg (~18mg/kg) q12h, starting  at 1100    -  Next vancomycin level(s): 2 days    -  Comments: Vanco ordered for sepsis secondary to epidural abscess. WBC on admit was 24, pt tachypnic with leukocytosis and hypotension. Pt received 1500mg initial loading dose at outside facility on  at 1453 and a second dose to complete it at [facility] on  at 2252. Laminectomy is being held for now due to high risk and current hemodynamic status. Renal function is stable. Floor pharmacist to follow and de-escalate if warranted.    Stephanie Staton, PharmD

## 2022-08-02 NOTE — PROCEDURES
Central Line Insertion    Date/Time: 8/2/2022 1:25 AM  Performed by: Julia Walker M.D.  Authorized by: Julia Walker M.D.     Consent:     Consent obtained:  Verbal    Consent given by:  Patient    Risks discussed:  Arterial puncture, incorrect placement, nerve damage, pneumothorax, infection and bleeding    Alternatives discussed:  No treatment, delayed treatment, alternative treatment and observation  Pre-procedure details:     Hand hygiene: Hand hygiene performed prior to insertion      Sterile barrier technique: All elements of maximal sterile technique followed      Skin preparation:  ChloraPrep    Skin preparation agent: Skin preparation agent completely dried prior to procedure    Anesthesia:     Anesthesia method:  Local infiltration    Local anesthetic:  Lidocaine 1% w/o epi  Procedure details:     Location:  R internal jugular    Patient position:  Reverse Trendelenburg    Procedural supplies:  Triple lumen    Catheter size:  7 Fr    Landmarks identified: yes      Ultrasound guidance: yes      Sterile ultrasound techniques: Sterile gel and sterile probe covers were used      Number of attempts:  1    Successful placement: yes    Post-procedure details:     Post-procedure:  Dressing applied and line sutured    Guidewire: guidewire removal confirmed      Assessment:  Blood return through all ports, no pneumothorax on x-ray, free fluid flow and placement verified by x-ray    Patient tolerance of procedure:  Tolerated well, no immediate complications

## 2022-08-02 NOTE — PROGRESS NOTES
Critical Care Progress Note    Date of admission  8/1/2022    Chief Complaint  61 y.o. male admitted 8/1/2022 with hx of HFrEF found to be 20-25%, ventricular thrombus on eliquis, pulmonary fibrosis on home oxygen, found to have epidural abscess T2-T9 at [facility] and transfer here for neurosurgical consultation was admitted to floor and transferred to ICU for acute management and BP augementation. Has a hx of prior spinal infection in 2019. Cardiology consulted s/p stat echo found to have endocarditis and vegetation on aortic valve with moderate AR.     Hospital Course  Admitted to ICU 8/1 for epidural abscess, sepsis and map augmentation    Interval Problem Update  Reviewed last 24 hour events:  Neuro:aox4 right lower 4/5 strength no pain  HR: 80-90's  SBP: 100-110's map goal change 70 epi 5 levo 20  Tmax: afebrile  GI: BM pta  UOP: 900ml jones  Lines: jones central line  Resp: 4l n/c   Vte: held   PPI/H2:ppi  Antibx: GPC in blood C3 and Vanco    Discuss with NSG and cardiology goal of BP augmentation changed to MAP >70 appreciate reconsideration  Stat CMP, ical now then BMP and lactate Q6  D/c NS  On midodrine 10mg Q8, vasopressin 0.03, norepi 10 and epi 6 to achieve map goal  CXR at 11 am reviewed  ID consultation Dr Kerr  INR 3.6 teg normal other then plat inhibition  CT abdomen for hip pain -> reviewed  Stopped vasopressin, changed to weight base dosing of epi gtt  Called family contact Ashley spoke with her and recommended against CPR or Intubation unless for procedure. Explained that I didn't think he was going to leave the hospital and very concerned with his trajectory -> she will discuss with rest of his family  Check ABG  Bicarb amp and bicarb tabs      Review of Systems  Review of Systems   Unable to perform ROS: Medical condition        Vital Signs for last 24 hours   Temp:  [36 °C (96.8 °F)-36.6 °C (97.8 °F)] 36 °C (96.8 °F)  Pulse:  [] 133  Resp:  [15-47] 15  BP: ()/(54-75)  98/55  SpO2:  [89 %-100 %] 98 %    Hemodynamic parameters for last 24 hours  CVP:  [18 MM HG-21 MM HG] 18 MM HG    Respiratory Information for the last 24 hours       Physical Exam   Physical Exam  Vitals and nursing note reviewed.   Constitutional:       General: He is not in acute distress.     Appearance: He is ill-appearing.      Comments: Sitting up in bed chronically ill appearing   HENT:      Head: Normocephalic.      Mouth/Throat:      Mouth: Mucous membranes are moist.   Eyes:      Pupils: Pupils are equal, round, and reactive to light.   Cardiovascular:      Rate and Rhythm: Tachycardia present. Rhythm irregular.      Heart sounds: No murmur heard.  Pulmonary:      Effort: No respiratory distress.      Breath sounds: No stridor. Rales present. No wheezing or rhonchi.      Comments: Rales 1/2 up on right and at base on left  Abdominal:      General: There is no distension.      Palpations: There is no mass.      Tenderness: There is no abdominal tenderness.      Hernia: No hernia is present.   Musculoskeletal:         General: Swelling present. No tenderness, deformity or signs of injury.      Comments: Pitting edema to sacrum and lower ext   Skin:     Coloration: Skin is pale.      Comments: Cold ext to all finger and toes and mottling to knees bilateral   Neurological:      Mental Status: He is alert.      Comments: Confused and not following commands, moves all extremities, no asterixis, protecting airway         Medications  Current Facility-Administered Medications   Medication Dose Route Frequency Provider Last Rate Last Admin   • norepinephrine (Levophed) 8 mg in 250 mL NS infusion (premix)  0-30 mcg/min Intravenous Continuous Pro Ballesteros M.D. 18.8 mL/hr at 08/02/22 1406 10 mcg/min at 08/02/22 1406   • insulin regular (HumuLIN R,NovoLIN R) injection  1-6 Units Subcutaneous Q6HRS Julia Walker M.D.   1 Units at 08/02/22 1131    And   • dextrose 50% (D50W) injection 25 g  25 g Intravenous Q15  MIN PRN Julia Walker M.D.       • oxyCODONE immediate-release (ROXICODONE) tablet 5 mg  5 mg Oral Q4HRS PRN Julia Walker M.D.   5 mg at 08/02/22 1138   • acetaminophen (Tylenol) tablet 650 mg  650 mg Oral Q4HRS PRN Julia Walker M.D.       • pravastatin (PRAVACHOL) tablet 40 mg  40 mg Oral Q EVENING Ruben Bradford M.D.       • vancomycin (VANCOCIN) 1,750 mg in  mL IVPB  1,750 mg Intravenous Q12HR MIGUEL Sevilla.O.   Stopped at 08/02/22 1331   • EPINEPHrine (Adrenalin) infusion 4 mg/250 mL (premix)  0-0.2 mcg/kg/min Intravenous Continuous Pro Ballesteros M.D. 35.4 mL/hr at 08/02/22 1245 0.1 mcg/kg/min at 08/02/22 1245   • sodium bicarbonate 8.4 % injection 50 mEq  50 mEq Intravenous Once Pro Ballesteros M.D.       • sodium bicarbonate tablet 650 mg  650 mg Oral Q8HRS Pro Ballesteros M.D.       • lactulose 20 GM/30ML solution 30 mL  30 mL Oral TID Pro Ballesteros M.D.       • MD Alert...Vancomycin per Pharmacy   Other PHARMACY TO DOSE MIGUEL Sevilla.O.       • senna-docusate (PERICOLACE or SENOKOT S) 8.6-50 MG per tablet 2 Tablet  2 Tablet Oral BID MIGUEL Sevilla.O.        And   • polyethylene glycol/lytes (MIRALAX) PACKET 1 Packet  1 Packet Oral QDAY PRN MIGUEL Sevilla.O.        And   • magnesium hydroxide (MILK OF MAGNESIA) suspension 30 mL  30 mL Oral QDAY PRN MIGUEL Sevilla.O.        And   • bisacodyl (DULCOLAX) suppository 10 mg  10 mg Rectal QDAY PRN Santos Banda D.O.       • cefTRIAXone (Rocephin) syringe 2 g  2 g Intravenous Q12HRS MIGUEL Sevilla.O.   2 g at 08/02/22 1125   • budesonide-formoterol (SYMBICORT) 80-4.5 MCG/ACT inhaler 2 Puff  2 Puff Inhalation BID (RT) Santos Banda D.O.       • midodrine (PROAMATINE) tablet 10 mg  10 mg Oral TID WITH MEALS Ruben Bradford M.D.   10 mg at 08/02/22 1131       Fluids    Intake/Output Summary (Last 24 hours) at 8/2/2022 1507  Last data filed at 8/2/2022 1200  Gross per 24 hour    Intake 2609.47 ml   Output 930 ml   Net 1679.47 ml       Laboratory  Recent Labs     08/01/22 2121 08/02/22  1414   AWPGS90S 7.44  --    NZSTNK350X 28.2  --    [id number] 83.0  --    OWLU1LOY 95.0  --    ARTHCO3 19  --    ARTBE -4  --    ISTATAPH  --  7.234*   ISTATAPCO2  --  37.9*   ISTATAPO2  --  92*   ISTATATCO2  --  17*   NSIUCOO0UYD  --  96   ISTATARTHCO3  --  16.0*   ISTATARTBE  --  -11*   ISTATTEMP  --  96.6 F   ISTATSPEC  --  Arterial   ISTATAPHTC  --  7.249*   [id number]  --  86         Recent Labs     08/01/22 2132 08/02/22  0600 08/02/22  1200   SODIUM 130* 132* 130*   POTASSIUM 3.9 4.5 4.5   CHLORIDE 99 98 97   CO2 16* 15* 13*   BUN 54* 60* 70*   CREATININE 1.26 1.45* 1.81*   MAGNESIUM 2.0 2.3  --    CALCIUM 7.3* 7.4* 7.2*     Recent Labs     08/01/22 2132 08/02/22  0600 08/02/22  1200   ALTSGPT 14 19  --    ASTSGOT 33 44  --    ALKPHOSPHAT 101* 106*  --    TBILIRUBIN 1.6* 1.7*  --    GLUCOSE 104* 142* 170*     Recent Labs     08/01/22 2121 08/01/22 2132 08/02/22  0600   WBC 24.0*  --   --    NEUTSPOLYS 94.90*  --   --    LYMPHOCYTES 0.00*  --   --    MONOCYTES 0.80  --   --    EOSINOPHILS 0.00  --   --    BASOPHILS 0.00  --   --    ASTSGOT  --  33 44   ALTSGPT  --  14 19   ALKPHOSPHAT  --  101* 106*   TBILIRUBIN  --  1.6* 1.7*     Recent Labs     08/01/22 2121 08/01/22 2132 08/02/22  0720   RBC 4.80  --   --    HEMOGLOBIN 13.2*  --   --    HEMATOCRIT 38.4*  --   --    PLATELETCT 186  --   --    PROTHROMBTM  --  34.5* 34.5*   APTT  --  36.3*  --    INR  --  3.60* 3.59*       Imaging  X-Ray:  I have personally reviewed the images and compared with prior images.  CT:    Reviewed  Echo:   Reviewed  MRI:   Reviewed    Assessment/Plan  * Abscess in epidural space of thoracic spine- (present on admission)  Assessment & Plan  Neurosurgery consulting patient is poor operative candidate  Medical management   ID consulting  Serial neurologic exam for worsening spinal cord compression  Map goal of > 70  after discussing with NSG, cardiology and to maximize perfusion to spinal cord with his poor LV and Mod AR  No IR intervention        Goals of care, counseling/discussion  Assessment & Plan  Called spoke with family 8/2 patient has a very concerning diagnosis and multiple comorbid conditions and unlikely to survive hospital discharge  Recommended against CPR and short intubation limit trial. Ashley daughter appreciate discussion and will discuss with family about patient wishes since he is unable to help us at this time with his encephalopathy    Palliative care consulted   Continue good communication with family and update them on patient trajectory    Pleural effusion, bilateral  Assessment & Plan  Likely chronic due to heart failure  Monitor need for diagnostic and or drainage    JOANA (acute kidney injury) (HCC)  Assessment & Plan  Maintain euvolemia and monitor fluid responsiveness (avoid NaCL and renal congestion)  MAP > 70 uses pressors or inotropic trial  Monitor urine output and I&O's  Avoid and review nephrotoxin medication  Rule out post obstruction -> ct abdomen negative  Consider renal U/S if no renal images  U/a and CPK    Careful volume expansion    Cirrhosis of liver with ascites (HCC)  Assessment & Plan  T Bili 1.7, INR 3.5 with ascites on ct scan  Unknown alcohol hx  Check hepatitis panel  Avoid hepatoxins  Serial monitor consider lactulose  Possible cardiac in etiology    Encephalopathy  Assessment & Plan  Metabolic, septic and cardiogenic in nature  Limit sedative  Aspiration precautions  Monitor need for intubation  Consider lactulose if worsening for possible hepatic encephalopathy with chronic liver disease    Acute bacterial endocarditis  Assessment & Plan  Vegetation seen on AV  With GPC bacteremia  ID consultation  Serial blood cx      Heart failure with reduced ejection fraction (HCC)  Assessment & Plan  EF 20% with asymmetric wall motions likely ischemic disease  Mod AR  Continue inotrope w/  epinephrine to support MAP and HR to 's and to prevent regurgitation of AR   Limit pure afterload agents   Use norepinephrine for map goal in addition to epi as above  Try to limit fluids and is already volume overloaded with ascites and pleural effusions    Prognosis poor palliative care consultation  Not a cardiac cath candidate with his potential surgical needs    Mural thrombus of heart- (present on admission)  Assessment & Plan  Chronic in nature holding anticoagulation with potential need for operative intervention  Restart when able  INR 3.5 with stable TEG   Due to his liver dysfunction  Eliquis was not reversed    Septic shock (HCC)- (present on admission)  Assessment & Plan  This is Septic shock Present on admission  SIRS criteria identified on my evaluation include: Tachycardia, with heart rate greater than 90 BPM and Leukocytosis, with WBC greater than 12,000  Indicators of septic shock include: Severe sepsis present and persistent hypotension after 30 ml/kg completed.   Sources is: bacteremia, epidural abscess and endocarditis  Sepsis protocol initiated  Crystalloid Fluid Administration: Patient has advanced or end-stage heart failure (with documentation of NYHA class III or symptoms with minimal exertion, Or NYHA class IV or symptoms at rest or with activity), for this/these reason(s) it is unsafe for this patient to receive 30 mL/kg of fluid  Partial Fluid Dose Given: 10 mL/kg per current or ideal body weight, patient to be reassessed shortly after completion of partial fluid bolus to ensure adequacy of resuscitation  IV antibiotics as appropriate for source of sepsis  Reassessment: I have reassessed the patient's hemodynamic status    ID consultation   Poor surgical candidate thus source control will be difficult      T2DM (type 2 diabetes mellitus) (HCC)- (present on admission)  Assessment & Plan  Tight glucose control to help promote infection control  Sliding scale coverage with hypoglycemic  protocol  HgA1C    Pulmonary fibrosis (HCC)- (present on admission)  Assessment & Plan  Hx of which make even harder due to his poor pulmonary reserve  Careful with volume expansion  Monitor need for mechanical support with Bipap or Intubation       VTE:  Contraindicated and SCD's  Ulcer: Not Indicated  Lines: Central Line  Ongoing indication addressed, Arterial Line  Ongoing indication addressed and Rodriguez Catheter  Ongoing indication addressed    I have performed a physical exam and reviewed and updated ROS and Plan today (8/2/2022). In review of yesterday's note (8/1/2022), there are no changes except as documented above.     Discussed patient condition and risk of morbidity and/or mortality with Family, RN, RT, Pharmacy, Code status disscussed, Charge nurse / hot rounds, Patient and cardiology, infectious disease and neurosurgery    The patient remains critically ill on vasopressin, norepinephrine, epinephrine and close neuromonitoring.  Critical care time = 125 minutes in directly providing and coordinating critical care and extensive data review.  No time overlap and excludes procedures.

## 2022-08-02 NOTE — PROGRESS NOTES
[facility]PITAL[facility] TRIAGE OFFICER DIRECT ADMISSION REPORT  Transferring facility: [facility]   Transferring physician: Bronson Guillermo   Transferring facility/physician contact number: [facility]   Chief complaint: back pain   Pertinent history & patient course: 60 yo male with past medical history of CHF with LV mural thrombus EF 20 to 25% on Eliquis, pulmonary fibrosis on home oxygen history of spinal infection 2019 hospitalized in [place] with IV antibiotics.  He presented to the ER with back pain found to have WBC count 23,000.  MRI done showed epidural abscess T2 T6 and T6-T9.  He was given broad-spectrum IV antibiotics.  Transferring provider reports no focal neurological deficits, no paresthesias, and no signs of cord compression.  Requesting higher level of care for neurosurgery consultation.    Pertinent imaging & lab results:  BMP 30 K, lactate 1.5, INR 2.9 (on Eliquis)  Code Status:  Full code  Further work up or recommendations per triage officer prior to transfer:  Send MRI images electronically  Consultants called prior to transfer and pertinent input from consultants: None  Patient accepted for transfer: Yes  Consultants to be called upon arrival:  Neurosurgery  Admission status: Inpatient.   Floor requested:  Telemetry  If ICU transfer, name of intensivist case discussed with and pertinent input from critical care:  None     Please inform the triage officer upon arrival of the patient to [facility] for assignment of a hospitalist to perform admission.      For any question or concerns regarding the care of this patient, please reach out to the assigned hospitalist.

## 2022-08-02 NOTE — ASSESSMENT & PLAN NOTE
Chronic in nature holding anticoagulation with potential need for operative intervention  Restart when able  INR 3.5 with stable TEG likely related to liver disease  Eliquis was not reversed on admission

## 2022-08-02 NOTE — PROGRESS NOTES
Brief follow up note:    Patient awake and responds to questions but appears to be altered and encephalopathic without being able to obtain meaningful answers from him. [place]. Does not appear to be in respiratory distress. Lying flat in bed without tachypnea. Bibasilar decreased air entry with fine rales. 2-3+ bilateral pitting edema (mainly around his ankles) with cold extremities. See NSG note on Neuro exam.    I personally reviewed his TTE images and discussed his progress with the Intensivist Dr. Ballesteros, the Neurosurgeon Dr. Wagner and NP Julia, and his RN.    MAPs have been maintained 80-85mmHg to ensure continued spine perfusion given his epidural abscess with mild motor function deficit.    His TTE findings of LV thrombus and signs of ischemic CM with his EG findings appear to be old in nature. ECG with normal MN interval.    Telemetry review does not reval any A fib or flutter at this time, some PACs and PVCs, in sinus with BBB.    Recommendations:     # septic shock  # Bacteremia GPCs  # epidural abscess  # AV endocarditis with moderate AR  # Layered LV apical thrombus  # Ischemic Cardiomyopathy  # Acute on ? chronic systolic heart failure  # Elevated troponin T in the 60's  # JOANA  # Hyponatremia  # Lactic acidosis with metabolic acidosis  # Encephalopathy    - Target MAP 70mmHg (discussed with NSG) for now. For his cardiomyopathy, it is ideal to be closer to 60-65mmHg.  - wean off pressors as tolerated, starting with vasopressin then epinephrine before deciding on milrinone (dobutamine on national shortage). Avoid BB or CCB at this time.  - stop midodrine  - consider IV diuresis to target at least net -1L/24 hours if possible  - Defer timing of surgery to NSG, he is at elevated risk.  - ongoing antibiotics  - Full dose AC and / or Aspirin 81mg once deemed safe by NSG.   - HI statin, target LDL < 70  - No clear benefit for MARISA at this time unless ID believes it will add value to his workup and prognosis.  Does not appear to be a surgical candidate at this time  - consider palliative care involvement.    No family contacts in his Epic chart for me to discuss the above and obtain more history.    Godwin Albright MD, MPH [facility]  Interventional Cardiologist  [facility]   of Clinical Internal Medicine - [facility]

## 2022-08-02 NOTE — CONSULTS
Consults   INFECTIOUS DISEASES INPATIENT CONSULT NOTE     Date of Service: 8/2/2022    Consult Requested By: Pro Ballesteros M.D.    Reason for Consultation: Atrial valve endocarditis, epidural abscess of thoracic spine    History of Present Illness:   Ramy Clark is a 61 y.o.  admitted 8/1/2022. Pt has a past medical history of HFrEF 20-25%, ventricular thrombus on Eliquis and pulm fibrosis on home oxygen.  Initially seen at [facility] where per notes he was found to have an epidural abscess at T2/T9 and transferred for neurosurgical consultation.  Neurosurgery evaluating and no plans for emergency surgery will continue to follow.  Echocardiogram has been done with finding of vegetation on the aortic valve.  Patient has been admitted to the ICU and continues to require pressor support.    Review Of Systems:  Review of Systems   Unable to perform ROS: Mental status change       PMH:   No past medical history on file.    PSH:  No past surgical history on file.    FAMILY HX:  No family history on file.  Reviewed family history. No pertinent family history.     SOCIAL HX:  Social History     Socioeconomic History   • Marital status: Not on file     Spouse name: Not on file   • Number of children: Not on file   • Years of education: Not on file   • Highest education level: Not on file   Occupational History   • Not on file   Tobacco Use   • Smoking status: Not on file   • Smokeless tobacco: Not on file   Substance and Sexual Activity   • Alcohol use: Not on file   • Drug use: Not on file   • Sexual activity: Not on file   Other Topics Concern   • Not on file   Social History Narrative   • Not on file     Social Determinants of Health     Financial Resource Strain: Not on file   Food Insecurity: Not on file   Transportation Needs: Not on file   Physical Activity: Not on file   Stress: Not on file   Social Connections: Not on file   Intimate Partner Violence: Not on file   Housing Stability: Not on file     Social  History     Tobacco Use   Smoking Status Not on file   Smokeless Tobacco Not on file     Social History     Substance and Sexual Activity   Alcohol Use Not on file       Allergies/Intolerances:  No Known Allergies    History reviewed per chart     Other Current Medications:    Current Facility-Administered Medications:   •  vasopressin (VASOSTRICT) 20 Units in  mL Infusion, 0.03 Units/min, Intravenous, Continuous, Ruben Bradford M.D., Stopped at 08/02/22 0931  •  norepinephrine (Levophed) 8 mg in 250 mL NS infusion (premix), 0-30 mcg/min, Intravenous, Continuous, Pro Ballesteros M.D., Last Rate: 37.5 mL/hr at 08/02/22 1151, 20 mcg/min at 08/02/22 1151  •  insulin regular (HumuLIN R,NovoLIN R) injection, 1-6 Units, Subcutaneous, Q6HRS, 1 Units at 08/02/22 1131 **AND** POC blood glucose manual result, , , Q6H **AND** NOTIFY MD and PharmD, , , Once **AND** Administer 20 grams of glucose (approximately 8 ounces of fruit juice) every 15 minutes PRN FSBG less than 70 mg/dL, , , PRN **AND** dextrose 50% (D50W) injection 25 g, 25 g, Intravenous, Q15 MIN PRN, Julia Walker M.D.  •  oxyCODONE immediate-release (ROXICODONE) tablet 5 mg, 5 mg, Oral, Q4HRS PRN, Julia Walker M.D., 5 mg at 08/02/22 1138  •  acetaminophen (Tylenol) tablet 650 mg, 650 mg, Oral, Q4HRS PRN, Julia Walker M.D.  •  pravastatin (PRAVACHOL) tablet 40 mg, 40 mg, Oral, Q EVENING, Ruben Bradford M.D.  •  vancomycin (VANCOCIN) 1,750 mg in  mL IVPB, 1,750 mg, Intravenous, Q12HR, Santos Banda D.O., Last Rate: 250 mL/hr at 08/02/22 1131, 1,750 mg at 08/02/22 1131  •  EPINEPHrine (Adrenalin) infusion 4 mg/250 mL (premix), 0-0.2 mcg/kg/min, Intravenous, Continuous, Pro Ballesteros M.D., Last Rate: 35.4 mL/hr at 08/02/22 1245, 0.1 mcg/kg/min at 08/02/22 1245  •  MD Alert...Vancomycin per Pharmacy, , Other, PHARMACY TO DOSE, Santos Banda D.O.  •  senna-docusate (PERICOLACE or SENOKOT S) 8.6-50 MG per tablet 2  Tablet, 2 Tablet, Oral, BID **AND** polyethylene glycol/lytes (MIRALAX) PACKET 1 Packet, 1 Packet, Oral, QDAY PRN **AND** magnesium hydroxide (MILK OF MAGNESIA) suspension 30 mL, 30 mL, Oral, QDAY PRN **AND** bisacodyl (DULCOLAX) suppository 10 mg, 10 mg, Rectal, QDAY PRN, Santos Banda D.O.  •  cefTRIAXone (Rocephin) syringe 2 g, 2 g, Intravenous, Q12HRS, MIGUEL Sevilla.O., 2 g at 22 1125  •  budesonide-formoterol (SYMBICORT) 80-4.5 MCG/ACT inhaler 2 Puff, 2 Puff, Inhalation, BID (RT), Santos Banda D.O.  •  midodrine (PROAMATINE) tablet 10 mg, 10 mg, Oral, TID WITH MEALS, Ruben Bradford M.D., 10 mg at 22 1131  [unfilled]    Most Recent Vital Signs:  BP (!) 98/55   Pulse (!) 111   Temp 36 °C (96.8 °F) (Temporal)   Resp 15   Wt 94.4 kg (208 lb 1.8 oz)   SpO2 99%   Temp  Av.2 °C (97.2 °F)  Min: 36 °C (96.8 °F)  Max: 36.6 °C (97.8 °F)    Physical Exam:  Physical Exam  Constitutional:       Appearance: Normal appearance.   HENT:      Head: Normocephalic and atraumatic.      Right Ear: External ear normal.      Left Ear: External ear normal.      Nose: Nose normal.      Mouth/Throat:      Mouth: Mucous membranes are moist.      Pharynx: Oropharynx is clear.   Eyes:      Extraocular Movements: Extraocular movements intact.      Conjunctiva/sclera: Conjunctivae normal.      Pupils: Pupils are equal, round, and reactive to light.   Cardiovascular:      Rate and Rhythm: Tachycardia present. Rhythm irregular.      Heart sounds: Murmur heard.   Pulmonary:      Effort: Pulmonary effort is normal.      Breath sounds: Normal breath sounds.   Abdominal:      General: Abdomen is flat. Bowel sounds are normal.      Palpations: Abdomen is soft.   Musculoskeletal:         General: Normal range of motion.      Cervical back: Normal range of motion.   Skin:     General: Skin is warm and dry.   Neurological:      Comments: Moving extremities, generally confused   Psychiatric:          "Mood and Affect: Mood normal.             Pertinent Lab Results:  Recent Labs     08/01/22 2121   WBC 24.0*      Recent Labs     08/01/22 2121   HEMOGLOBIN 13.2*   HEMATOCRIT 38.4*   MCV 80.0*   MCH 27.5   MACROCYTOSIS 1+   ANISOCYTOSIS 1+   PLATELETCT 186         Recent Labs     08/01/22 2132 08/02/22  0600 08/02/22  1200   SODIUM 130* 132* 130*   POTASSIUM 3.9 4.5 4.5   CHLORIDE 99 98 97   CO2 16* 15* 13*   CREATININE 1.26 1.45* 1.81*        Recent Labs     08/01/22 2132 08/02/22  0600   ALBUMIN 1.7* 2.0*        Pertinent Micro:  Results     Procedure Component Value Units Date/Time    MRSA By PCR (Amp) [[id number]] Collected: 08/02/22 0730    Order Status: Completed Specimen: Respirate from Nares Updated: 08/02/22 1142     MRSA by PCR POSITIVE    Narrative:      Collected By: [id number] KOTA SINGH  Per [facility] Lab Protocol  Calling Banner Swartzsen    BLOOD CULTURE [[id number]] Collected: 08/01/22 2132    Order Status: Completed Specimen: Blood from Peripheral Updated: 08/02/22 0735     Significant Indicator NEG     Source BLD     Site PERIPHERAL     Culture Result No Growth  Note: Blood cultures are incubated for 5 days and  are monitored continuously.Positive blood cultures  are called to the RN and reported as soon as  they are identified.      Narrative:      Per Hospital Policy: Only change Specimen Src: to \"Line\" if  specified by physician order.  Right Hand    BLOOD CULTURE [[id number]] Collected: 08/01/22 2121    Order Status: Completed Specimen: Blood from Peripheral Updated: 08/02/22 0735     Significant Indicator NEG     Source BLD     Site PERIPHERAL     Culture Result No Growth  Note: Blood cultures are incubated for 5 days and  are monitored continuously.Positive blood cultures  are called to the RN and reported as soon as  they are identified.      Narrative:      Per Hospital Policy: Only change Specimen Src: to \"Line\" if  specified by physician order.  Brachial    Blood Culture [[id number]]     " Order Status: No result Specimen: Blood from Peripheral     Blood Culture [[id number]]     Order Status: No result Specimen: Blood from Peripheral         No results found for: BLOODCULTU, BLDCULT, BCHOLD     Studies:  CT-ABDOMEN-PELVIS W/O    Result Date: 8/2/2022 8/2/2022 10:47 AM HISTORY/REASON FOR EXAM:  Sepsis; right hip pain with endocarditis concerns for psoas abscess vs lytic lesion. TECHNIQUE/EXAM DESCRIPTION: CT scan of the abdomen and pelvis without contrast. Noncontrast helical scanning was obtained from the diaphragmatic domes through the pubic symphysis. Low dose optimization technique was utilized for this CT exam including automated exposure control and adjustment of the mA and/or kV according to patient size. COMPARISON: Outside CT abdomen and pelvis with contrast obtained 8/1/2022. FINDINGS: Please note that noncontrast CT is significantly limited in sensitivity for detecting solid organ abnormalities. Lower Chest: Bilateral basilar interstitial disease is present, right greater than left. There are trace bilateral pleural effusions. There is moderate cardiomegaly. Liver: Grossly normal. Spleen: Diminutive but otherwise grossly normal. Patient's known splenic laceration is not visualized on this exam. Pancreas: Unremarkable. Gallbladder: No calcified stones. Biliary: Nondilated. Adrenal glands: Normal. Kidneys: Bilateral renal enhancement likely consistent with persistent nephrogram following outside facility contrast demonstration. No evidence of hydronephrosis. Bowel: No obstruction or acute inflammation. Lymph nodes: No adenopathy. Vasculature: Atherosclerosis. Peritoneum: Small amount of ascites. Musculoskeletal: Diffuse anasarca. No asymmetry of the psoas muscles to suggest abscess. Pelvis: Small amount of free fluid, presacral edema.     1.  No asymmetry of the psoas musculature to suggest abscess. Evaluation is limited in the absence of intravenous contrast administration. 2.  Bilateral  basilar interstitial pulmonary disease. 3.  Trace bilateral pleural effusions. 4.  Moderate cardiomegaly. 5.  Diminutive spleen, otherwise unremarkable. Patient's suspected splenic laceration identified on the previous outside imaging is not visualized on this exam. 6.  Small amount of ascites. 7.  Persistent bilateral nephrograms suggestive of renal dysfunction. 8.  Atherosclerosis. 9.  Anasarca.    DX-CHEST-FOR LINE PLACEMENT Perform procedure in: Patient's Room    Result Date: 8/2/2022 8/2/2022 1:30 AM HISTORY/REASON FOR EXAM: CVC placement TECHNIQUE/EXAM DESCRIPTION:  PA and lateral views of the chest. COMPARISON: None FINDINGS: Right internal jugular central line is seen terminating in the superior vena cava.   Cardiomegaly is observed. The mediastinal contour appears within normal limits.  The central  pulmonary vasculature appears prominent and indistinct. Asymmetric elevation of the right hemidiaphragm is noted.  Diffuse scattered hazy pulmonary parenchymal opacities are seen. No significant pleural effusions are identified. The bony structures appear age-appropriate.     1.  Pulmonary edema and/or infiltrates. 2.  Cardiomegaly    DX-CHEST-PORTABLE (1 VIEW)    Result Date: 8/2/2022 8/2/2022 11:10 AM HISTORY/REASON FOR EXAM:  Shortness of Breath TECHNIQUE/EXAM DESCRIPTION AND NUMBER OF VIEWS: Single portable view of the chest. COMPARISON: CT 8/2/2022 FINDINGS: Right central venous catheter with tip in the SVC. Interstitial and groundglass opacities throughout both lungs, right more than left. Small bilateral pleural effusions. No pneumothorax. Stable cardiopericardial silhouette.     Interstitial and groundglass opacity throughout both lungs, right more than left, could relate to interstitial lung disease. Superimposed infection cannot be excluded. Small bilateral pleural effusions.    US-RUQ    Result Date: 8/2/2022 8/2/2022 4:42 AM HISTORY/REASON FOR EXAM:  Abnormal Labs, Abdominal pain  TECHNIQUE/EXAM DESCRIPTION AND NUMBER OF VIEWS:  Real-time sonography of the liver and biliary tree. COMPARISON: None FINDINGS: The liver is irregular in contour. There is no evidence of solid mass lesion. The liver measures 14.7 cm. Gallbladder sludge is seen. There is no evidence of cholelithiasis.  The gallbladder wall thickness measures 3.6 mm. There is no pericholecystic fluid. The common duct measures 6.5 mm. The pancreas is obscured by bowel gas. The visualized aorta is normal in caliber. Intrahepatic IVC is patent. The portal vein is patent with hepatopetal flow. The MPV measures 1.0 cm. The right kidney measures 10.3 cm. Perihepatic collection of ascites is seen Small right pleural effusion is seen.     1.  Gallbladder sludge with thickened gallbladder wall, nonspecific findings in the setting of hepatocellular disease, consider acalculous cholecystitis. Could be further evaluated with HIDA scan as clinically appropriate. 2.  Common bile duct dilatation, consider causes of biliary obstruction with additional workup as clinically appropriate. 3.  Perihepatic ascites 4.  Small right pleural effusion    EC-ECHOCARDIOGRAM COMPLETE W/ CONT    Result Date: 8/2/2022  Transthoracic Echo Report Echocardiography Laboratory CONCLUSIONS No prior study is available for comparison. Mildly dilated left ventricular cavity size with severely reduced systolic function.  Visually estimated LVEF of 25-30%.  Severe hypokinesis to akinesis of the anteroseptal and apical walls.  Layering mural thrombus seen in apex.  Trileaflet aortic valve with echogenic density 1 x 1.5 cm attached to right coronary cusp on the LVOT side that can represent either vegetation or thrombus.  Moderate eccentric aortic regurgitation. Mild mitral and tricuspid regurgitation. Consider MARISA to better assess aortic valve and possible vegetation/thrombus if clinically indicated. RUBY DA SILVA Exam Date:         08/02/2022                    02:52 Exam  Location:     Inpatient Priority:          Stat Ordering Physician:        TATIANNA MARTINEZ Referring Physician: Sonographer:               Jeimy English [facility] Age:    61     Gender:    M MRN:    [id number] :    1960 BSA:    2.17   Ht (in):    72     Wt (lb):    208 Exam Type:     Complete, Contrast Indications:     Congestive Heart Failure ICD Codes:       428 CPT Codes:       23414 BP:   100    /   57     HR:   84 Technical Quality:       Fair MEASUREMENTS  (Male / Female) Normal Values 2D ECHO LV Diastolic Diameter PLAX        5.9 cm                4.2 - 5.9 / 3.9 - 5.3 cm LV Systolic Diameter PLAX         3.9 cm                2.1 - 4.0 cm IVS Diastolic Thickness           0.9 cm                LVPW Diastolic Thickness          1 cm                  LVOT Diameter                     2.3 cm                Estimated LV Ejection Fraction    25 %                  LV Ejection Fraction MOD BP       49.1 %                >= 55  % LV Ejection Fraction MOD 4C       46.8 %                LV Ejection Fraction MOD 2C       45.8 %                IVC Diameter                      2.1 cm                DOPPLER AV Peak Velocity                  1.4 m/s               AV Peak Gradient                  8 mmHg                AV Mean Gradient                  5 mmHg                LVOT Peak Velocity                0.78 m/s              AV Area Cont Eq vti               2.5 cm2               Mitral E Point Velocity           0.76 m/s              Mitral E to A Ratio               2                     MV Pressure Half Time             33.1 ms               MV Area PHT                       6.6 cm2               MV Deceleration Time              114 ms                TR Peak Velocity                  277 cm/s              PV Peak Velocity                  0.58 m/s              PV Peak Gradient                  1.3 mmHg              RVOT Peak Velocity                0.42 m/s              * Indicates values subject to  auto-interpretation LV EF:  25    % FINDINGS Left Ventricle Contrast was used to evaluate for thrombus in the left ventricular apex. Large layering mural thrombus is observed in the left ventricular apex.  3 _ mL of contrast was administered. Existing IV was used.  Severely hypokinetic to akinetic anteroseptal and apical walls. The left ventricle is mildly dilated. Severely reduced left ventricular systolic function. The left ventricular ejection fraction is visually estimated to be 25-30%.  Restrictive mitral inflow velocity.  Gross calculation based on LVOT VTI and LVOT diameter of 2.3 cm, cardiac output is approximately 4 L/min. Right Ventricle Normal right ventricular size and low normal systolic function. Right Atrium Normal right atrial size. Dilated inferior vena cava without inspiratory collapse. Left Atrium Mildly dilated left atrium. Left atrial volume index is 35 mL/sq m. Mitral Valve Thickened mitral valve leaflets. Mild mitral regurgitation. No mitral stenosis. Aortic Valve Trileaflet aortic valve.  There is an echogenic density 1 x 1.5 cm attached to right coronary cusp on the LVOT side that can represent either vegetation/thrombus.  Moderate eccentric aortic insufficiency. Tricuspid Valve Structurally normal tricuspid valve. Mild tricuspid regurgitation. Right atrial pressure is estimated to be 15 mmHg. Estimated right ventricular systolic pressure is 34 mmHg. Pulmonic Valve Structurally normal pulmonic valve without significant stenosis and trace regurgitation. Pericardium Normal pericardium without effusion. Aorta Normal aortic root for body surface area. The ascending aorta is not well visualized. Jackelyn Latham MD (Electronically Signed) Final Date:     02 August 2022                 04:24      ASSESSMENT/PLAN:     61 y.o.  admitted 8/1/2022. Pt has a past medical history of HFrEF 20-25%, ventricular thrombus on Eliquis and pulm fibrosis on home oxygen.  Initially seen at [facility] where per  notes he was found to have an epidural abscess at T2/T9 and transferred for neurosurgical consultation.  Neurosurgery evaluating and no plans for emergency surgery.  Echocardiogram has been done with finding of vegetation on the aortic valve.      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  Moderate aortic regurgitation.  Layering mural thrombus seen in apex  Thoracic spine epidural abscess  Prior epidural abscess per notes, will need to clarify  HFrEF, ~25%  Ventricular thrombus and has been on Eliquis  Pulmonary fibrosis on home oxygen    Plan     --- Continue vancomycin, will stop ceftriaxone   --- Follow-up blood cultures  --- Repeat blood cultures ordered for a.m.  --- Recommend MARISA to better evaluate thrombus versus infective vegetation  --- Monitor for any new focal neurologic symptoms  --- Neurosurgery following  --- Monitor labs    Dispo: TBD  PICC: Yes, once blood cultures have cleared for 72 hours      Plan of care discussed with IM Pro Ballesteros M.D.. Will continue to follow    Yara Kerr M.D.

## 2022-08-02 NOTE — CONSULTS
Cardiology Initial Consult Note    Date of note:    8/2/2022      Consulting Physician: Pro Ballesteros M.D.    Name:   Ramy Clark     YOB: 1960  Age:   61 y.o.  male   MRN:   [id number]      Reason for Consultation: Apical thrombus, can Eliquis be reversed    HPI:  Ramy Clark is a 61 y.o. male with a history of heart failure reduced ejection fraction, left ventricular apical thrombus, on Eliquis, pulmonary fibrosis on home oxygen, who has a history of spinal infection from 2019 and was hospitalized.  He was treated with IV antibiotics.  He presented to an outside hospital, [facility], where he was found to have a white count of 23,000 and MRI showed an epidural abscess.  He was transferred to [facility] for further management and possible surgery.Upon arrival, upon arrival to the telemetry floor at [facility], patient's blood pressure was 79/55 satting 95% on 4 L.  Lactic acid was 2.2, sodium 130.  He was then transferred to the intensive care unit.    The patient is not really able to give a good history.  He states he has had leg swelling since December.  He also has trouble with urination.  He denies any other symptoms.  Denies shortness of breath, no chest pain, no orthopnea, no PND.  He does not remember if he has any heart history.    Review of Systems   HENT: Positive for hearing loss.    Cardiovascular: Positive for leg swelling. Negative for chest pain and dyspnea on exertion.   Respiratory: Negative for cough.    Gastrointestinal: Positive for bloating.     A complete ROS was performed but was difficult due to patient and is negative except for pertinent positives mentioned in HPI.      No past medical history on file.  Patient also not able to give a good history.    No past surgical history on file.  Patient not able to give a good history.      No medications prior to admission.     Current Facility-Administered Medications   Medication Dose Route Frequency Provider Last Rate Last  Admin   • vasopressin (VASOSTRICT) 20 Units in  mL Infusion  0.03 Units/min Intravenous Continuous Ruben Bradford M.D. 9 mL/hr at 08/02/22 0106 0.03 Units/min at 08/02/22 0106   • norepinephrine (Levophed) 8 mg in 250 mL NS infusion (premix)  0-30 mcg/min Intravenous Continuous Julia Walker M.D. 18.8 mL/hr at 08/02/22 0112 10 mcg/min at 08/02/22 0112   • NS infusion   Intravenous Continuous Julia Walker M.D. 75 mL/hr at 08/02/22 0218 New Bag at 08/02/22 0218   • insulin regular (HumuLIN R,NovoLIN R) injection  1-6 Units Subcutaneous Q6HRS Julia Walker M.D.        And   • dextrose 50% (D50W) injection 25 g  25 g Intravenous Q15 MIN PRN Julia Walker M.D.       • oxyCODONE immediate-release (ROXICODONE) tablet 5 mg  5 mg Oral Q4HRS PRN Julia Walker M.D.       • acetaminophen (Tylenol) tablet 650 mg  650 mg Oral Q4HRS PRN Julia Walker M.D.       • omeprazole (PRILOSEC) capsule 20 mg  20 mg Oral DAILY Ruben Bradford M.D.       • pravastatin (PRAVACHOL) tablet 40 mg  40 mg Oral Q EVENING Ruben Bradford M.D.       • vancomycin (VANCOCIN) 1,750 mg in  mL IVPB  1,750 mg Intravenous Q12HR Santos Banda D.O.       • MD Alert...Vancomycin per Pharmacy   Other PHARMACY TO DOSE Santos Banda D.O.       • senna-docusate (PERICOLACE or SENOKOT S) 8.6-50 MG per tablet 2 Tablet  2 Tablet Oral BID Santos Banda D.O.        And   • polyethylene glycol/lytes (MIRALAX) PACKET 1 Packet  1 Packet Oral QDAY PRN Santos Banda D.O.        And   • magnesium hydroxide (MILK OF MAGNESIA) suspension 30 mL  30 mL Oral QDAY PRN Santos Banda D.O.        And   • bisacodyl (DULCOLAX) suppository 10 mg  10 mg Rectal QDAY PRN Santos Banda D.O.       • cefTRIAXone (Rocephin) syringe 2 g  2 g Intravenous Q12HRS Santos Banda D.O.   2 g at 08/01/22 5991   • budesonide-formoterol (SYMBICORT) 80-4.5 MCG/ACT inhaler 2 Puff  2 Puff Inhalation BID (RT) Santos DEXTER  ZHANE Banda       • [Held by provider] aspirin EC (ECOTRIN) tablet 81 mg  81 mg Oral DAILY Santos Banda D.O.       • EPINEPHrine (Adrenalin) infusion 4 mg/250 mL (premix)  0-10 mcg/min Intravenous Continuous Ruben Bradford M.D. 22.5 mL/hr at 08/02/22 0230 6 mcg/min at 08/02/22 0230   • midodrine (PROAMATINE) tablet 10 mg  10 mg Oral TID WITH MEALS Ruben Bradford M.D.   10 mg at 08/02/22 0014         No family history on file.  Patient not able to give a history.        Intake/Output Summary (Last 24 hours) at 8/2/2022 0426  Last data filed at 8/2/2022 0300  Gross per 24 hour   Intake 1528.91 ml   Output 450 ml   Net 1078.91 ml        Physical Exam  There is no height or weight on file to calculate BMI.  /69   Pulse 85   Temp 36 °C (96.8 °F) (Temporal)   Resp (!) 27   Wt 94.4 kg (208 lb 1.8 oz)   SpO2 99%   Vitals:    08/02/22 0230 08/02/22 0245 08/02/22 0300 08/02/22 0315   BP: 111/74 115/74 112/59 103/69   Pulse: 88 90 87 85   Resp: (!) 32 (!) 40 (!) 33 (!) 27   Temp:       TempSrc:       SpO2: 100% 98% 100% 99%   Weight:         Oxygen Therapy:  Pulse Oximetry: 99 %, O2 (LPM): 4, O2 Delivery Device: Silicone Nasal Cannula    General: Disheveled and in no apparent distress  Eyes: nl conjunctiva  ENT: OP clear  Neck: JVP not elevated, no carotid bruits  Lungs: Difficult exam as patient cannot cooperate, anterior exam suggest crackles at the bases  Heart: RRR, 1/6 soft early diastolic or late systolic murmur, no rubs or gallops, trace to +1 pitting edema bilateral lower extremities. No LV/RV heave on cardiac palpatation. 1+ bilateral radial pulses.  1+ bilateral dp pulses.  Both extremities are cool but patient is on   nor epi and epi drip   Abdomen: soft, non tender, non distended, obese  Neurological: No focal sensory deficits  Psychiatric: Appropriate affect, A/O x 2  Skin: cool extremities      Labs (personally reviewed and notable for):   Lab Results   Component Value Date/Time     SODIUM 130 (L) 08/01/2022 09:32 PM    POTASSIUM 3.9 08/01/2022 09:32 PM    CHLORIDE 99 08/01/2022 09:32 PM    CO2 16 (L) 08/01/2022 09:32 PM    GLUCOSE 104 (H) 08/01/2022 09:32 PM    BUN 54 (H) 08/01/2022 09:32 PM    CREATININE 1.26 08/01/2022 09:32 PM      Lab Results   Component Value Date/Time    WBC 24.0 (H) 08/01/2022 09:21 PM    RBC 4.80 08/01/2022 09:21 PM    HEMOGLOBIN 13.2 (L) 08/01/2022 09:21 PM    HEMATOCRIT 38.4 (L) 08/01/2022 09:21 PM    MCV 80.0 (L) 08/01/2022 09:21 PM    MCH 27.5 08/01/2022 09:21 PM    MCHC 34.4 08/01/2022 09:21 PM    MPV 10.2 08/01/2022 09:21 PM    NEUTSPOLYS 94.90 (H) 08/01/2022 09:21 PM    LYMPHOCYTES 0.00 (L) 08/01/2022 09:21 PM    MONOCYTES 0.80 08/01/2022 09:21 PM    EOSINOPHILS 0.00 08/01/2022 09:21 PM    BASOPHILS 0.00 08/01/2022 09:21 PM    ANISOCYTOSIS 1+ 08/01/2022 09:21 PM    INR 3.60 (H) 08/01/2022 09:32 PM      No results found for: CHOLSTRLTOT, LDL, HDL, TRIGLYCERIDE    Lab Results   Component Value Date/Time    TROPONINT 63 (H) 08/02/2022 0200     No results found for: NTPROBNP  No results found for: HBA1C      Cardiac Imaging and Procedures Review:    EKG dated 8/2/2022: My personal interpretation is sinus rhythm, premature atrial beat with ventricular escape rhythm, right bundle branch block, inferior wall infarct age undetermined, anterior infarct age undetermined.  No prior ECGs for comparison    Echo dated 8/2/2022: My personal interpretation is dilated left ventricular cavity size with severely decreased systolic function.  Antral septal and apical wall akinesis with a layering mural thrombus.  Trileaflet aortic valve with echogenic density 1 x 1.5 cm attached to   right coronary cusp on the LVOT side that can represent either   vegetation or thrombus.  Moderate eccentric aortic regurgitation.   Mild mitral and tricuspid regurgitation.    Chest x-ray 8/2/2022 shows bilateral interstitial edema/infiltrate no prior for comparison.    Radiology test  Review:  EC-ECHOCARDIOGRAM COMPLETE W/ CONT   Final Result      DX-CHEST-FOR LINE PLACEMENT Perform procedure in: Patient's Room   Final Result         1.  Pulmonary edema and/or infiltrates.   2.  Cardiomegaly      OUTSIDE IMAGES-MR THORACIC SPINE   Final Result      OUTSIDE IMAGES-CT CERVICAL SPINE   Final Result      CT-FOREIGN FILM CAT SCAN   Final Result      US-RUQ    (Results Pending)       Problem list:  Principal Problem:    Abscess in epidural space of thoracic spine POA: Yes  Active Problems:    Pulmonary fibrosis (HCC) POA: Yes    T2DM (type 2 diabetes mellitus) (HCC) POA: Yes    Septic shock (HCC) POA: Yes    Mural thrombus of heart POA: Yes    Lactic acidosis POA: Yes    Hypo-osmolar hyponatremia POA: Yes    Heart failure with reduced ejection fraction (HCC) POA: Unknown  Resolved Problems:    Microcytic anemia POA: Yes    Impression and Medical Decision Makin.  Heart failure with reduced ejection fraction suggesting an ischemic cardiomyopathy with wall motion abnormality.  His second chest x-ray would suggest some fluid overload, although clinically he has some trace pitting edema but does not suggest he is extremely fluid overloaded.  He is currently on 3 pressors I suspect more from septic shock however there may be slight role of cardiogenic shock.  By echo, his cardiac output appears to be around 4 L/min.  At some point may need an inotrope such as dobutamine along with pressors but not currently.  If the patient continues to require more pressors, a pulmonary artery catheter/[place]-Olga catheter may help with fluid management.    2.  Left ventricular apical thrombus.  This is a mural thrombus so less likely to embolize, however still can.  It is not clear how long he has been on Eliquis.  Typically risk of embolization is within the first 3 months of diagnosis.  Although there is a risk of embolization, it does decrease after 3 months.  Even if he has not been on it for 3 months, if surgery is  urgent, should go ahead and reverse the Eliquis.    3.  Echogenic density seen on the aortic valve consistent with aortic valve vegetation.  This likely is seeded from his epidural abscess.  There is moderate aortic regurgitation.    4.  Hypotension is likely multifactorial, but predominantly septic shock.  However as we do give some fluid boluses, he may get into decompensated heart failure.  If that is so he may need some inotrope i.e. such as dobutamine or milrinone to help.  If the story is confusing then consider a pulmonary artery catheter [place]-Olga catheter to help with fluid management.    Recommendations:  1.  Although patient does have a left ventricular apical thrombus, this is a layering mural thrombus so risk of embolization is last.  Nevertheless embolization does exist.  However if surgery is urgent, it would appear the risk of embolization from reversing Eliquis is less than the risk of not doing surgery and would go ahead and reverse Eliquis if need be.    2.  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  Will defer to ID for treatment.  He does have moderate aortic regurgitation.  If there is worse compromise, consider transesophageal echo to better assess.    Thank you for allowing me to participate in the care of this patient, cardiology will continue to follow.  Please contact me with any questions.      Jackelyn Latham M.D.  Cardiologist, [facility] [place]     This note was generated using voice recognition software which has a small chance of producing errors of grammar and possibly content. I have made every reasonable attempt to find and correct any obvious errors, but expect that some may not be found prior to finalization of this note.

## 2022-08-02 NOTE — PLAN OF CARE (IOPOC)
He suffered sudden cardiac arrest, wide complex tachycardia and then PEA.  ROSC after 1 round CPR, no meds.    He has agonal respirations.  I have called his daughter Ashley at [phone] to discuss his condition.  I relayed my impression that no matter our intervention he will unfortunately die due to complex underlying medical problems.  I relayed my impression that CPR and intubation will not offer a meaningful outcome for him.  Ashley decided to make him DNR/DNI but asked to continue other efforts while she came to the hospital to see him.    I told her we would certainly try, and conveyed my deepest sympathies.      Plan:  - dnr/dni  - continue vasopressors    Román George M.D.

## 2022-08-02 NOTE — PROCEDURES
"Arterial Line Insertion    Date/Time: 8/2/2022 7:43 AM  Performed by: Joss Knott M.D.  Authorized by: Joss Knott M.D.   Consent: Verbal consent obtained. Written consent obtained.  Risks and benefits: risks, benefits and alternatives were discussed (Witnessed by charge RN )  Consent given by: patient  Patient understanding: patient states understanding of the procedure being performed  Patient consent: the patient's understanding of the procedure matches consent given  Procedure consent: procedure consent matches procedure scheduled  Patient identity confirmed: verbally with patient and arm band  Time out: Immediately prior to procedure a \"time out\" was called to verify the correct patient, procedure, equipment, support staff and site/side marked as required.  Preparation: Patient was prepped and draped in the usual sterile fashion.  Indications: hemodynamic monitoring  Location: left radial  Anesthesia: local infiltration    Sedation:  Patient sedated: no    Cong's test normal: yes  Needle gauge: 20  Seldinger technique: Seldinger technique used  Number of attempts: 1  Post-procedure: line sutured and dressing applied  Post-procedure CMS: normal  Patient tolerance: patient tolerated the procedure well with no immediate complications              "

## 2022-08-02 NOTE — ASSESSMENT & PLAN NOTE
Hx of which make even harder due to his poor pulmonary reserve  Careful with volume expansion  Monitor need for mechanical support with Bipap or Intubation

## 2022-08-02 NOTE — ASSESSMENT & PLAN NOTE
Lactic acid 2.2 on admission, likely secondary to sepsis, trend, supportive care  - Will be admitted to the ICU for pressor support

## 2022-08-02 NOTE — ASSESSMENT & PLAN NOTE
On eliquis  - However with potential neurosurgical intervention, will need cardiology input as patient would be high risk of being taken off blood thinners for procedure

## 2022-08-02 NOTE — ASSESSMENT & PLAN NOTE
Maintain euvolemia and monitor fluid responsiveness (avoid NaCL and renal congestion)  MAP > 70 uses pressors or inotropic trial  Monitor urine output and I&O's  Avoid and review nephrotoxin medication  Rule out post obstruction -> ct abdomen negative  Consider renal U/S if no renal images  U/a and CPK  Worsening  Trial of lasix 8/3  Worsening will try high dose of lasix and diuril today 8/4

## 2022-08-02 NOTE — PROGRESS NOTES
"Pharmacy Vancomycin Kinetics Note for 8/2/2022     61 y.o. male on Vancomycin day # 1     Vancomycin Indication (AUC Dosing):    Vancomycin Indication (Two level/Trough based Dosing): Sepsis (goal trough 15-20)    Provider specified end date: 08/15/22    Active Antibiotics (From admission, onward)    Ordered     Ordering Provider       Tue Aug 2, 2022  3:25 AM    08/02/22 0325  vancomycin (VANCOCIN) 1,750 mg in  mL IVPB  (vancomycin (VANCOCIN) IV (LD + Maintenance))  EVERY 12 HOURS         Santos Banda D.O.       Mon Aug 1, 2022 10:07 PM    08/01/22 2207  cefTRIAXone (Rocephin) syringe 2 g  EVERY 12 HOURS         Santos Banda D.O.       Mon Aug 1, 2022  9:20 PM    08/01/22 2120  MD Alert...Vancomycin per Pharmacy  PHARMACY TO DOSE        Question:  Indication(s) for vancomycin?  Answer:  Epidural abscess/vertebral osteomyelitis    Santos Banda D.O.          Dosing Weight: 94.4 kg (208 lb 1.8 oz)      Admission History: Admitted on 8/1/2022 for Epidural abscess [G06.2]  Pertinent history: Pt presented to outside facility with back and RLE pain and was found to have an epidural abcess on MRI imaging. He was given 1500mg vancomycin and 4.5g of Zosyn prior to arrival. WBC upon transfer to [facility] were 24. Pt found to be tachypnic, hypotensive, and septic with infection secondary to the epidural abcess.    Allergies:     Patient has no allergy information on record.     Pertinent cultures to date:     Results     Procedure Component Value Units Date/Time    BLOOD CULTURE [[id number]] Collected: 08/01/22 2132    Order Status: Sent Specimen: Blood from Peripheral Updated: 08/01/22 2200    Narrative:      Per Hospital Policy: Only change Specimen Src: to \"Line\" if  specified by physician order.    BLOOD CULTURE [[id number]] Collected: 08/01/22 2121    Order Status: Sent Specimen: Blood from Peripheral Updated: 08/01/22 2200    Narrative:      Per Hospital Policy: Only change Specimen Src: to \"Line\" " if  specified by physician order.    Blood Culture [[id number]]     Order Status: No result Specimen: Blood from Peripheral     Blood Culture [[id number]]     Order Status: No result Specimen: Blood from Peripheral     MRSA By PCR (Amp) [[id number]]     Order Status: No result Specimen: Respirate from Nares           Labs:     CrCl cannot be calculated (Unknown ideal weight.).  Recent Labs     22   WBC 24.0*   NEUTSPOLYS 94.90*   BANDSSTABS 4.30     Recent Labs     22   BUN 54*   CREATININE 1.26   ALBUMIN 1.7*       Intake/Output Summary (Last 24 hours) at 2022 0343  Last data filed at 2022 0300  Gross per 24 hour   Intake 1528.91 ml   Output 450 ml   Net 1078.91 ml      /69   Pulse 85   Temp 36 °C (96.8 °F) (Temporal)   Resp (!) 27   Wt 94.4 kg (208 lb 1.8 oz)   SpO2 99%  Temp (24hrs), Av.2 °C (97.2 °F), Min:36 °C (96.8 °F), Max:36.6 °C (97.8 °F)      List concerns for Vancomycin clearance:     BUN/Scr ratio greater than 20:1;CHF;Pressors/Hypotension    Pharmacokinetics:     Trough kinetics:   No results for input(s): VANCOTROUGH, VANCOPEAK, VANCORANDOM in the last 72 hours.    A/P:     -  Vancomycin dose: 1750mg (~18mg/kg) q12h, starting  at 1100    -  Next vancomycin level(s): 2 days    -  Comments: Vanco ordered for sepsis secondary to epidural abscess. WBC on admit was 24, pt tachypnic with leukocytosis and hypotension. Pt received 1500mg initial loading dose at outside facility on  at 1453 and a second dose to complete it at [facility] on  at 2252. Laminectomy is being held for now due to high risk and current hemodynamic status. Renal function is stable. Floor pharmacist to follow and de-escalate if warranted.    Stephanie Staton, PharmD

## 2022-08-02 NOTE — ASSESSMENT & PLAN NOTE
Tight glucose control to help promote infection control  Sliding scale coverage with hypoglycemic protocol  HgA1C

## 2022-08-02 NOTE — ASSESSMENT & PLAN NOTE
This is Severe Sepsis Present on admission  SIRS criteria identified on my evaluation include: Tachypnea, with respirations greater than 20 per minute and Leukocytosis, with WBC greater than 12,000  Clinical indicators of end organ dysfunction include Systolic blood pressure (SBP) <90 mmHg or mean arterial pressure <65 mmHg  Source is epidural abscess  Sepsis protocol initiated  Crystalloid Fluid Administration: Patient has advanced or end-stage heart failure (with documentation of NYHA class III or symptoms with minimal exertion, Or NYHA class IV or symptoms at rest or with activity), for this/these reason(s) it is unsafe for this patient to receive 30 mL/kg of fluid  Partial Fluid Dose Given: 10 mL/kg per current or ideal body weight, patient to be reassessed shortly after completion of partial fluid bolus to ensure adequacy of resuscitation  IV antibiotics as appropriate for source of sepsis  Reassessment: I have reassessed the patient's hemodynamic status

## 2022-08-02 NOTE — PROGRESS NOTES
Patient admitted to T703. Tele box placed, monitor room notified, RR verified. Patient A+Ox4, lethargic, somnolent at this time.  Admitting MD called to bedside. MD notified of BP 85/56. 500mL bolus infusing per MAR.

## 2022-08-02 NOTE — ASSESSMENT & PLAN NOTE
Called spoke with family 8/2 patient has a very concerning diagnosis and multiple comorbid conditions and unlikely to survive hospital discharge  Recommended against CPR and short intubation limit trial. Ashley daughter appreciate discussion and will discuss with family about patient wishes since he is unable to help us at this time with his encephalopathy    Palliative care consulted   Continue good communication with family and update them on patient trajectory  Patient suffered cardiac arrest 8/2    Spoke again with family at bedside she was going to start comfort care this morning but with him awakening she would like to see if she could have a chance for him to awake more. She did agree if patient worsening from respiratory or uncomfortable will transition to comfort measures as she doesn't want him to suffer.     8/4 spoke with daughter some family coming later today

## 2022-08-02 NOTE — DOCUMENTATION QUERY
[facility]                                                                       Query Response Note      PATIENT:               RUBY DA SILVA  ACCT #:                  [id number]  MRN:                     [id number]  :                      1960  ADMIT DATE:       2022 8:33 PM  DISCH DATE:          RESPONDING  PROVIDER #:        [id number]           QUERY TEXT:    Please clarify the clinical relevance for the diagnostic finding of  -  BUN/Cr/GFR = 54/1.26/65 --> 60/1.45/55 --> 70/1.81/42.  Per  Cardiology PN - Dx JOANA.    NOTE- If an appropriate response is not listed below, please respond with a new note.      The patient's clinical indicators include:   -  BUN/Cr/GFR = 54/1.26/65 --> 60/1.45/55 --> 70/1.81/42     Cardiology PN - Dx JOANA    Treatment - IV LR bolus x 2, IV NS infusion; IV Vancomycin, Ceftriaxone    Risk factors - Septic shock, chronic systolic heart failure, chronic respiratory failure    Thank you,  Yecenia BURRELLN  Clinical   Connect via [email or web address]  Options provided:   -- JOANA is ruled in   -- Findings of no clinical significance   -- Other explanation, (please specify the other explanation)   -- Unable to determine      Query created by: Yecenia Caicedo on 2022 1:40 PM    RESPONSE TEXT:    JOANA is ruled in          Electronically signed by:  BRANDAN CARDENAS MD 2022 2:34 PM

## 2022-08-02 NOTE — ASSESSMENT & PLAN NOTE
T Bili 1.7, INR 3.5 with ascites on ct scan  Unknown alcohol hx  Check hepatitis panel -> negative  Avoid hepatoxins  lactulose-> unable to safely swallow and family request no cortrac  Possible cardiac in etiology

## 2022-08-02 NOTE — PROGRESS NOTES
Dr Ballesteros notified of + BC gram + cocci in clusters and positive on MRSA PCR.     Also updated pt having low urine out put for the shift, 500ml plasmalyte bolus ordered

## 2022-08-02 NOTE — CONSULTS
Critical Care Consult Note    DATE OF CONSULTATION:  8/1/2022     REFERRING PHYSICIAN:  Santos Banda MD     CONSULTANT:  Julia Walker MD     REASON FOR CONSULTATION:  Sepsis secondary to epidural abscess     HISTORY OF PRESENT ILLNESS:   60 y/o male with history of heart failure with reduced ejection fraction 20-25% with left ventricle mural thrombus on Eliquis, pulmonary fibrosis with chronic hypoxic respiratory failure requiring 4L O2 at all times, diabetes mellitus, history of spinal infection while hospitalized in [place] treated with course of 6 weeks of IV antibiotics without surgical intervention. Patient presented to outside hospital of [facility] with complaints of back pain and complaints of pain in right lower extremity with leukocytosis of 23k and MRI of spine completed showing dorsal and ventral epidural abscess T2, T6, and T6-9 with mass effect on thoracic spine and was given IV antibiotics and transferred to [facility] for neurosurgical evaluation.    On arrival to this hospital patient per admitting provider was somnolent and minimally responsive with questioning and afebrile with blood pressures in 80s/50s and heart rate in 90s saturating at baseline oxygen requirement of 4L. IVF fluid boluses were administered to improve hypotension, however did not increase pressures.     Given concerns of persistent hypotension and epidural abscess with thoracic spine mass effect critical care team was consulted for evaluation of transfer to higher level of care. Discussed case with neurosurgery, Dr. Wagner who recommended transfer to ICU and goal of MAP 80-85mmHg. Prior to transfer on evaluation of patient he reports is having back pain and burning pain in his right thigh, that is worsened with dorsiflexion of his right foot. Otherwise denies any other ongoing acute symptoms.     Spoke to daughter of patient Ashley at [phone] who provided that patient was treated for the spinal  infection prior in 2019 at [facility] in [place], [place]. Also, that his primary care provider is Robbin FONG in [place], [place] at [facility].     PAST MEDICAL HISTORY:   -Heart failure with reduced ejection fraction  -Left ventricle mural thrombus  -Chronic anticoagulation use  -Pulmonary fibrosis with chronic hypoxic respiratory failure  -Type II diabetes mellitus  -History of unspecified spinal infection requiring IV antibiotic course     PAST SURGICAL HISTORY:   -Patient denies past surgical history     ALLERGIES:  -No known drug allergies     MEDICATIONS PRIOR TO ADMISSION:  -Unable to obtain     SOCIAL HISTORY:  -Tobacco: Denies  -Alcohol: Denies  -Drugs: Denies  -Living: [place], [place]    FAMILY HISTORY:  -Denies any significant past family medical history     REVIEW OF SYSTEMS:  Review of systems (+10 systems) unremarkable except for concerns noted by patient or items listed.    Please see HPI for additional ROS.     PHYSICAL EXAMINATION:  Encounter Vitals  Standard Vitals  Vitals  Blood Pressure: (!) 77/57  Temperature: 36.6 °C (97.8 °F)  Temp src: Temporal  Pulse: 96  Respiration: (!) 22  Pulse Oximetry: 95 %  Weight: 92.3 kg (203 lb 7.8 oz)  Encounter Vitals  Temperature: 36.6 °C (97.8 °F)  Temp src: Temporal  Blood Pressure: (!) 77/57  BP Location: Left, Upper Arm  Patient BP Position: Supine  Pulse: 96  Respiration: (!) 22  Pulse Oximetry: 95 %  O2 (LPM): 4  O2 Delivery Device: Silicone Nasal Cannula  Weight: 92.3 kg (203 lb 7.8 oz)  Weight Source: Bed Scale  Pulmonary-Specific Vitals    GENERAL:  Ill-appearing, arousable, slurred speech  HEENT:  Normocephalic, atraumatic,  NECK:  Collapsible bilateral internal jugular veins on ultrasound, supple  PULM:  Clear to auscultation bilaterally,  CVS:  Regular rate and rhythm, no murmurs noted  ABDOMEN:  Soft, mild distention, no tenderness or guarding  EXTREMITIES: No peripheral edema noted  SKIN:  Warm, dry, no apparent skin  lesions  NEURO: Strength 4/5 in right lower extremity, strength 5/5 in left lower extremity. Sensation intact.     LABORATORY DATA:    Lab Results   Component Value Date/Time    WBC 24.0 (H) 08/01/2022 09:21 PM    RBC 4.80 08/01/2022 09:21 PM    HEMOGLOBIN 13.2 (L) 08/01/2022 09:21 PM    HEMATOCRIT 38.4 (L) 08/01/2022 09:21 PM    MCV 80.0 (L) 08/01/2022 09:21 PM    MCH 27.5 08/01/2022 09:21 PM    MCHC 34.4 08/01/2022 09:21 PM    MPV 10.2 08/01/2022 09:21 PM    NEUTSPOLYS 94.90 (H) 08/01/2022 09:21 PM    LYMPHOCYTES 0.00 (L) 08/01/2022 09:21 PM    MONOCYTES 0.80 08/01/2022 09:21 PM    EOSINOPHILS 0.00 08/01/2022 09:21 PM    BASOPHILS 0.00 08/01/2022 09:21 PM    ANISOCYTOSIS 1+ 08/01/2022 09:21 PM      Lab Results   Component Value Date/Time    SODIUM 130 (L) 08/01/2022 09:32 PM    POTASSIUM 3.9 08/01/2022 09:32 PM    CHLORIDE 99 08/01/2022 09:32 PM    CO2 16 (L) 08/01/2022 09:32 PM    GLUCOSE 104 (H) 08/01/2022 09:32 PM    BUN 54 (H) 08/01/2022 09:32 PM    CREATININE 1.26 08/01/2022 09:32 PM         IMAGING:  OUTSIDE IMAGES-MR THORACIC SPINE   Final Result      OUTSIDE IMAGES-CT CERVICAL SPINE   Final Result      CT-FOREIGN FILM CAT SCAN   Final Result      EC-ECHOCARDIOGRAM COMPLETE W/O CONT    (Results Pending)          ASSESSMENT/PLAN:  61 year old male with severe heart failure EF of 20-25% and chronic hypoxic respiratory failure on chronic 4L oxygen secondary to pulmonary fibrosis and a history of LV thrombus on DOAC who was transferred from outside hospital due to multi level thoracic spine epidural abscess with mass effect started on broad spectrum antibiotics requiring neurosurgical evaluation and close neurological monitoring. Given patient's chronic anticoagulant use will need Cardiology evaluation and stat echocardiogram to assess for patient's ability to stop his Eliquis and in setting of multiple co-morbidities concerns for patient to be able to tolerate 3-4 hour thoracic laminectomy per neurosurgery. No  plans at this time for emergent surgery until completed cardiology evaluation, correction of PT, and neurological deterioration.    Septic shock (HCC)  This is Severe Sepsis Present on admission  SIRS criteria identified on my evaluation include: Tachypnea, with respirations greater than 20 per minute and Leukocytosis, with WBC greater than 12,000  Clinical indicators of end organ dysfunction include Systolic blood pressure (SBP) <90 mmHg or mean arterial pressure <65 mmHg  Source is epidural abscess  Sepsis protocol initiated  Crystalloid Fluid Administration: Patient has advanced or end-stage heart failure (with documentation of NYHA class III or symptoms with minimal exertion, Or NYHA class IV or symptoms at rest or with activity), for this/these reason(s) it is unsafe for this patient to receive 30 mL/kg of fluid  Partial Fluid Dose Given: 10 mL/kg per current or ideal body weight, patient to be reassessed shortly after completion of partial fluid bolus to ensure adequacy of resuscitation  IV antibiotics as appropriate for source of sepsis  Reassessment: I have reassessed the patient's hemodynamic status    Plan:  -Continue IV antibiotics and follow cultures  -Close neurological monitoring for any deterioration which could escalate urgency of surgical intervention, plan for now is to continue medical management and optimize patient in case of needed surgery  -MAP goal 80-85, with Midodrine and additional vasopressor support as needed      Abscess in epidural space of thoracic spine  Transferred for MRI from outside hospital confirmed thoracic epidural abscess with mass effect on spine.  Per report at outside hospital patient had urine/fecal incontinence  History of spinal infection unspecified per patient that was treated with IV antibiotics alone without any surgical intervention  Received Ceftriaxone and Vancomycin on admission    Plan:  -Transfer to ICU  -Continue IV antibiotics with Ceftriaxone 2g q12h +  Vancomycin  -Follow up blood cultures  -Neurosurgery following, given multiple co-morbidities and chronic anticoagulation use no emergent surgery until safely can be removed from Eliquis and correction of PT as well as having neurological deterioration given the high risk of surgical operation  -Cardiology consult for stat echocardiogram and assess LV thrombus and ability to stop Eliquis  -Neuro checks q2h  -MAP goal 80-85 mmHg - will start on Midodrine with as needed Epinephrine support  -NPO, ok sips with medications  -MRSA nares pending      Pulmonary fibrosis (HCC)  Chronic hypoxic respiratory failure on 4L at baseline  Difficult to assess pulmonary fibrosis versus edema on bedside ultrasound, however asymmetrical distribution of B-lines appears less consistent with ongoing pulmonary edema - in combination with collapsible bilateral internal jugular veins    Plan:  -Continue home inhalers  -Continue O2 supplementation, currently at baseline requirement  -Cautious fluid administration, if any respiratory decompensation do not continue      Mural thrombus of heart  On Eliquis at home  No prior echocardiogram on file, unclear on timing of diagnosis and duration patient has been on anticoagulation    Plan:  -Cardiology consultation for stat echocardiogram and discussion to be able to take patient off of Eliquis in order to optimize for any future surgical intervention if necessary      Heart failure with reduced ejection fraction (HCC)  EF of 20-25% per patient history  Bedside ultrasound consistent with severe LV dysfunction as well as signs of RV hypertrophy consistent with underlying pulmonary disease  On bedside ultrasound evaluation does not appear fluid overloaded currently, however judicious use of fluids given his systolic function and pulmonary disease    Plan:  -Not in acute exacerbation at this time  -If worsening respiratory status with additional fluids, stop fluids and may need to consider gentle  diuresis    Lactic acidosis  Lactic acid 2.2 on admission  Likely secondary to sepsis, trend, supportive care  S/p 500cc of crystalloid    Plan:  -Trend lactate  -Expect improvement of lactate with hemodynamic support measures as mentioned above with Midodrine and IV vasopressors as needed      T2DM (type 2 diabetes mellitus) ([facility])  Plan:  -Hypoglycemic protocol  -Sliding scale insulin  -Diabetic diet when able to eat      Hypo-osmolar hyponatremia  Na 130 on admission, asymptomatic and mild  Serum osmolarity 285  Most likely secondary to hypovolemia in setting of sepsis  S/p total 500cc of isotonic fluids    Plan:  -Continue to monitor  -Judicious use of fluids in setting of severe heart failure    Microcytic anemia  Hemoglobin 13.2, MCV 80  No active signs of symptoms of bleeding    Plan:  -Continue to monitor      Code Status: Full Code  VTE PPX: SCD's  GI PPX: N/A  Dispo: Transfer to ICU for higher level of care      Ruben Bradford M.D.

## 2022-08-02 NOTE — ASSESSMENT & PLAN NOTE
Patient presenting for thoracic epidural abscess with urine/fecal incontinence, MRI done at [facility] with craniocaudal 1cmx8.2cm from T6-T9. Digital images uploaded to epic.  Patient reports prior episode in 2019  - Consutled neurosurg, recommended repeat stat MRI throacic spine w/wo  - GCS 12  - Received 1 dose of vanc at [facility] before transfer, repeated blood cultures on arrival  - Vancomycin and ceftriaxone for empiric coverage

## 2022-08-02 NOTE — PROGRESS NOTES
Pt seen, examined, consult dictated/communicated to Dr Walker.  Motor power IP: 3R/4+ L: Quads 4 Rt/5Lt; DF 5 bilat (some giveway Rt); PF 5 bilat.  No c/o numbness.  PT 34/Elaquis.  Rec:1) ICU transfer, 2) A line/pressores to keep MAP 80-85, 3)  Cardiology consult ASAP:  Can pt come off Elaquis/ be reversed with Hx thrombus in his heart/Can he survive 3-4 hr T al?  New ECHO.  4) Neuro checks q 2 hrs.  5)  Told RNs to bladder scan, Rodriguez if >500 cc.  No emergency T al until ok'd by Cardiology and PT corrected AND neuro deterioration.  Would try and manage medically given his significant medical Hx.

## 2022-08-02 NOTE — ASSESSMENT & PLAN NOTE
This is Septic shock Present on admission  SIRS criteria identified on my evaluation include: Tachycardia, with heart rate greater than 90 BPM and Leukocytosis, with WBC greater than 12,000  Indicators of septic shock include: Severe sepsis present and persistent hypotension after 30 ml/kg completed.   Sources is: bacteremia, epidural abscess and endocarditis  Sepsis protocol initiated  Crystalloid Fluid Administration: Patient has advanced or end-stage heart failure (with documentation of NYHA class III or symptoms with minimal exertion, Or NYHA class IV or symptoms at rest or with activity), for this/these reason(s) it is unsafe for this patient to receive 30 mL/kg of fluid  Partial Fluid Dose Given: 10 mL/kg per current or ideal body weight, patient to be reassessed shortly after completion of partial fluid bolus to ensure adequacy of resuscitation  IV antibiotics as appropriate for source of sepsis  Reassessment: I have reassessed the patient's hemodynamic status    ID consultation   Poor surgical candidate thus source control will be difficult

## 2022-08-02 NOTE — ASSESSMENT & PLAN NOTE
EF 20% with asymmetric wall motions likely ischemic disease  Mod AR  Continue inotrope w/ epinephrine to support MAP and maintain HR to 's and to prevent regurgitation of AR   Limit pure afterload agents and reduce map or go off SBP goal 100-120's  Use norepinephrine for map goal in addition to epi as above  Try to limit fluids and is already volume overloaded with ascites and pleural effusions  Suffered VT arrest 8/2   Patient with map goal changed his extremities has had improved warmth    Prognosis poor palliative care consultation  Not a cardiac cath candidate with his potential surgical needs  Trial of force diuresis

## 2022-08-02 NOTE — PROGRESS NOTES
Neurosurgery Progress Note    Subjective:  On pressors- Levo, vaso, epi  States back and RLE pain with exam  Rodriguez    Exam:  [place]  LLE intact 5/5 throughout with coaching  RLE limited by pain/hearing 4/5 R IP, quad -4/5, add/ab- IP quad patient positioned decent resistance against examiner.    BP  Min: 75/54  Max: 116/68  Pulse  Av.3  Min: 60  Max: 96  Resp  Av.8  Min: 15  Max: 47  Temp  Av.2 °C (97.2 °F)  Min: 36 °C (96.8 °F)  Max: 36.6 °C (97.8 °F)  Monitored Temp 2  Av.1 °C (96.9 °F)  Min: 35.9 °C (96.62 °F)  Max: 36.2 °C (97.16 °F)  SpO2  Av.3 %  Min: 89 %  Max: 100 %    No data recorded    Recent Labs     22   WBC 24.0*   RBC 4.80   HEMOGLOBIN 13.2*   HEMATOCRIT 38.4*   MCV 80.0*   MCH 27.5   MCHC 34.4   RDW 51.8*   PLATELETCT 186   MPV 10.2     Recent Labs     22  0600   SODIUM 130* 132*   POTASSIUM 3.9 4.5   CHLORIDE 99 98   CO2 16* 15*   GLUCOSE 104* 142*   BUN 54* 60*   CREATININE 1.26 1.45*   CALCIUM 7.3* 7.4*     Recent Labs     22   APTT 36.3*   INR 3.60*     Recent Labs     22  0200   REACTMIN 7.2   CLOTKINET 1.1   CLOTANGL 75.1   MAXCLOTS 64.8   PRCINADP 67.9*   PRCINAA 48.3*       Intake/Output                       22 - 22 0659 22 - 2259     [id number] Total [id number] Total                 Intake    P.O.  --  120 120  --  -- --    P.O. -- 120 120 -- -- --    I.V.  --  571.5 571.5  199.5  -- 199.5    Vasopressin Volume -- 44.1 44.1 18 -- 18    Norepinephrine Volume -- 90.2 90.2 37.8 -- 37.8    Epinephrine Volume -- 159.6 159.6 45 -- 45    Volume (mL) (NS infusion) -- 277.5 277.5 98.8 -- 98.8    IV Piggyback  --  1319.9 1319.9  --  -- --    Volume (mL) (lactated ringer BOLUS infusion) -- 500 500 -- -- --    Volume (mL) (lactated ringer BOLUS infusion) -- 500 500 -- -- --    Volume (mL) (lactated ringer BOLUS infusion) -- 0 0 -- -- --    Volume (mL) (vancomycin (VANCOCIN)  750 mg in  mL IVPB) -- 249.9 249.9 -- -- --    Volume (mL) (cefTRIAXone (Rocephin) syringe 2 g) -- 70 70 -- -- --    Total Intake -- 2011.4 2011.4 199.5 -- 199.5       Output    Urine  --  910 910  20  -- 20    Urine Void (mL) -- 450 450 -- -- --    Output (mL) (Urethral Catheter Temperature probe) -- 460 460 20 -- 20    Stool  --  -- --  --  -- --    Number of Times Stooled -- 0 x 0 x -- -- --    Total Output -- 910 910 20 -- 20       Net I/O     -- 1101.4 1101.4 179.5 -- 179.5            Intake/Output Summary (Last 24 hours) at 8/2/2022 0839  Last data filed at 8/2/2022 0800  Gross per 24 hour   Intake 2210.9 ml   Output 930 ml   Net 1280.9 ml            • vasopressin (PITRESSIN) infusion  0.03 Units/min Continuous   • norepinephrine (Levophed) infusion  0-30 mcg/min Continuous   • insulin regular  1-6 Units Q6HRS    And   • dextrose bolus  25 g Q15 MIN PRN   • oxyCODONE immediate-release  5 mg Q4HRS PRN   • acetaminophen  650 mg Q4HRS PRN   • omeprazole  20 mg DAILY   • pravastatin  40 mg Q EVENING   • vancomycin  1,750 mg Q12HR   • MD Alert...Vancomycin per Pharmacy   PHARMACY TO DOSE   • senna-docusate  2 Tablet BID    And   • polyethylene glycol/lytes  1 Packet QDAY PRN    And   • magnesium hydroxide  30 mL QDAY PRN    And   • bisacodyl  10 mg QDAY PRN   • cefTRIAXone (ROCEPHIN) IV  2 g Q12HRS   • budesonide-formoterol  2 Puff BID (RT)   • EPINEPHrine (Adrenalin) infusion  0-10 mcg/min Continuous   • midodrine  10 mg TID WITH MEALS       Assessment and Plan:  Hospital day #2 Thoracic LEVAR  Prophylactic anticoagulation: no         Start date/time: tbd    DW Favian Herozg Vacca    MAP >70  Cardiac thrombus/vegitation/ HF- EF 25%  ABX  Discussed exam with nursing- will call with changes  Pelvis xray  Dr Wagnre and Cardiology discussed case- to surgery only if neurological decline

## 2022-08-02 NOTE — ASSESSMENT & PLAN NOTE
Neurosurgery consulting patient is poor operative candidate  Medical management   ID consulting  Serial neurologic exam for worsening spinal cord compression  Map goal of > 70 after discussing with NSG, cardiology and to maximize perfusion to spinal cord with his poor LV and Mod AR  No IR intervention    Patient suffered cardiac arrest 8/2   Thus map goal changes to 60-65  Prognosis poor with no source control and patient would not tolerate surgery

## 2022-08-02 NOTE — H&P
Date of Admission: 8/1/2022  Admission Status: Inpatient  [facility] Team: [facility]  Attending: Dr. To  Resident: Dr. Santos Banda  Contact Number: [phone]    Chief Complaint:    Transfer from [place] for epidural abscess    History of Present Illness (HPI):   Patient is a 61-year-old male with past medical history of chronic hypoxic respiratory failure secondary to pulmonary fibrosis, mural thrombus on Eliquis, and chronic heart failure with reduced ejection fraction who presents as a transfer from [facility] for thoracic epidural abscess.  Before transfer, patient did not have any neurological deficits per chart review, however on arrival patient was unable to lift the right leg off the bed with weakness in his hip flexors, and reports urine and fecal incontinence.  Patient initially presented with lower back pain that started 2 weeks ago.  Denies any fever, chills, vision changes, headache, lightheadedness.  Patient does report a burning sensation down his legs, and main pain is over his right anterior thigh.  Denies any upper extremity weakness, numbness, sensory changes.    On arrival to the telemetry floor, patient's blood pressure 79/55, respiratory rate 22, pulse 93, oxygen saturation 95% on 4 L.  Labs: WBC 24, lactic acid 2.2, sodium 130, CO2 16, BUN 54, creatinine 1.26.  INR 3.6, PTT 36.3, Pro-Salbador 2.43    MRI at outside facility demonstrating dorsal and ventral epidural abscesses/hematoma/mass with mass-effect, T6-T9 1 cm x 8.2 cm    Review of Systems:   Review of Systems   Constitutional: Negative for chills and fever.   HENT: Negative for congestion.    Eyes: Negative for blurred vision and pain.   Respiratory: Negative for cough, shortness of breath and wheezing.    Cardiovascular: Positive for leg swelling. Negative for chest pain and palpitations.   Gastrointestinal: Negative for abdominal pain, constipation, diarrhea, nausea and vomiting.   Genitourinary: Negative for dysuria.   Musculoskeletal:  Negative for myalgias.   Skin: Negative for rash.   Neurological: Positive for focal weakness and weakness. Negative for dizziness, tingling, loss of consciousness and headaches.   Psychiatric/Behavioral: Negative for memory loss.       Past Medical History:   Past Medical History was reviewed with patient.   has no past medical history on file.    Past Surgical History: Past Surgical History was reviewed with patient.   has no past surgical history on file.    Medications: Medications have been reviewed with patient.  None        Allergies: Allergies have been reviewed with patient.  Not on File    Family History:   family history is not on file.     Social History:   Tobacco: Denies  Alcohol:  denies  Recreational drugs (illegal and prescription):   denies  Living situation:  Lives in [facility]    Primary Care Provider: reviewed No primary care provider on file.    Physical Exam:   Vitals:  Temp:  [36.6 °C (97.8 °F)] 36.6 °C (97.8 °F)  Pulse:  [96] 96  Resp:  [22] 22  BP: (75-88)/(54-59) 85/56  SpO2:  [95 %] 95 %    Physical Exam  Vitals and nursing note reviewed.   Constitutional:       General: He is not in acute distress.  HENT:      Mouth/Throat:      Mouth: Mucous membranes are dry.   Eyes:      General: No scleral icterus.     Extraocular Movements: Extraocular movements intact.      Pupils: Pupils are equal, round, and reactive to light.   Cardiovascular:      Rate and Rhythm: Normal rate and regular rhythm.      Heart sounds: No murmur heard.    No friction rub. No gallop.   Pulmonary:      Effort: No respiratory distress.      Breath sounds: Rales present. No wheezing or rhonchi.   Abdominal:      General: Abdomen is flat. Bowel sounds are normal. There is no distension.      Palpations: Abdomen is soft.      Tenderness: There is no abdominal tenderness.   Musculoskeletal:         General: Swelling present. No tenderness.      Cervical back: No rigidity.   Skin:     General: Skin is warm.    Neurological:      Mental Status: He is alert and oriented to person, place, and time. He is confused.      Cranial Nerves: Cranial nerves are intact.      Motor: Weakness present. No tremor.      Comments: Unable to lift right leg off bed, spinal tenderness, dorsiflexion/plantarflexion intact b/l.   Psychiatric:         Speech: Speech is slurred.         Behavior: Behavior is cooperative.         Labs:   HEMATOLOGY/ ONCOLOGY/ID:            Recent Labs     08/01/22 2121   WBC 24.0*   RBC 4.80   HEMOGLOBIN 13.2*   HEMATOCRIT 38.4*   MCV 80.0*   MCH 27.5   RDW 51.8*   PLATELETCT 186   MPV 10.2   NEUTSPOLYS 94.90*   LYMPHOCYTES 0.00*   MONOCYTES 0.80   EOSINOPHILS 0.00   BASOPHILS 0.00   RBCMORPHOLO Present     No results found for: [id number], FOLATE, FERRITIN, IRON, TOTIRONBC    RENAL:        Estimated GFR/CRCL = CrCl cannot be calculated (Unknown ideal weight.).  Recent Labs     08/01/22 2132   CREATININE 1.26   MAGNESIUM 2.0       GASTROINTESTINAL/ HEPATIC:          Recent Labs     08/01/22 2121 08/01/22 2132   INR  --  3.60*   MACROCYTOSIS 1+  --      No results found for: AMMONIA    ENDOCRINE:              No results for input(s): GLUCOSE, POCGLUCOSE in the last 72 hours.  No results found for: HBA1C, TSH, FREET4, FREET3, CORTISOL       Imaging:   OUTSIDE IMAGES-MR THORACIC SPINE   Final Result      OUTSIDE IMAGES-CT CERVICAL SPINE   Final Result      CT-FOREIGN FILM CAT SCAN   Final Result      EC-ECHOCARDIOGRAM COMPLETE W/O CONT    (Results Pending)         Previous Data Review: reviewed    Assessment/Plan:    I anticipate patient will require at least 2 midnights stays because patient has epidural abscess with neurologic findings and red flag signs with right lower extremity weakness and urinary fecal incontinence.  Patient will be admitted to the ICU.    * Abscess in epidural space of thoracic spine- (present on admission)  Assessment & Plan  Patient presenting for thoracic epidural abscess with  urine/fecal incontinence, MRI done at [facility] with craniocaudal 1cmx8.2cm from T6-T9. Digital images uploaded to epic.  Patient reports prior episode in 2019  - Consutled neurosurg, recommended repeat stat MRI throacic spine w/wo  - GCS 12  - Received 1 dose of vanc at [facility] before transfer, repeated blood cultures on arrival  - Vancomycin and ceftriaxone for empiric coverage      Septic shock (HCC)- (present on admission)  Assessment & Plan  This is Severe Sepsis Present on admission  SIRS criteria identified on my evaluation include: Tachypnea, with respirations greater than 20 per minute and Leukocytosis, with WBC greater than 12,000  Clinical indicators of end organ dysfunction include Systolic blood pressure (SBP) <90 mmHg or mean arterial pressure <65 mmHg  Source is epidural abscess  Sepsis protocol initiated  Crystalloid Fluid Administration: Patient has advanced or end-stage heart failure (with documentation of NYHA class III or symptoms with minimal exertion, Or NYHA class IV or symptoms at rest or with activity), for this/these reason(s) it is unsafe for this patient to receive 30 mL/kg of fluid  Partial Fluid Dose Given: 10 mL/kg per current or ideal body weight, patient to be reassessed shortly after completion of partial fluid bolus to ensure adequacy of resuscitation  IV antibiotics as appropriate for source of sepsis  Reassessment: I have reassessed the patient's hemodynamic status    Hypo-osmolar hyponatremia- (present on admission)  Assessment & Plan  Sodium admission 130, serum osmolarity 285  - Likely secondary to hypovolemia  - Small fluid boluses given reduced ejection fraction    Lactic acidosis- (present on admission)  Assessment & Plan  Lactic acid 2.2 on admission, likely secondary to sepsis, trend, supportive care  - Will be admitted to the ICU for pressor support    Mural thrombus of heart- (present on admission)  Assessment & Plan  On eliquis  - However with potential  neurosurgical intervention, will need cardiology input as patient would be high risk of being taken off blood thinners for procedure    T2DM (type 2 diabetes mellitus) (HCC)- (present on admission)  Assessment & Plan  Hypoglycemia protocol, insulin sliding scale    Pulmonary fibrosis (HCC)- (present on admission)  Assessment & Plan  Evidence on imaging, continuing home inhalers       Code status: Full  Tubes/lines/drains: PIV  Diet: Cardiac  DVT PPX: SCDs  GI PPX: Bowel regimen  Disposition: TBD    This note was generated using voice recognition software which has a small chance of producing errors of grammar and possibly content. I have made every reasonable attempt to find and correct any obvious errors, but expect that some may not be found prior to finalization of this note.

## 2022-08-02 NOTE — CONSULTS
DATE OF SERVICE:  08/01/2022     NEUROSURGICAL CONSULTATION     REFERRING PHYSICIAN:  Julia Walker MD and [facility] internal medicine service.     HISTORY OF PRESENT ILLNESS:  A 61-year-old male apparently admitted to outside   facility with back pain, weakness, had a thoracic MRI, which shows epidural   abscess from T2 down to T9, the upper portion is dorsal, the lower portion is   ventral.  No axial studies are included.  A lumbar MRI does not show any   significant compression.  The patient has a history of heart dysfunction with   ejection fraction 20%, thrombus in his heart, he is on Eliquis.  Also, history   of pulmonary fibrosis, on continuous oxygen therapy.     REVIEW OF SYSTEMS:  No history of bleeding disorder.  Currently, he is denying   any numbness in his legs.     LABORATORY DATA:  Shows hemoglobin is low at 13, white count is elevated at   24.  Sodium is low at 130, glucose 104, BUN is elevated, creatinine is 1.26.    Lactic acid is mildly elevated at 2.2.  The patient's protime is 34.5 with an   INR of 3.6.     PHYSICAL EXAMINATION:  GENERAL:  The patient is in no acute distress.  He is conversant.  VITAL SIGNS:  Currently, his mean arterial pressure is low.  His systolic   blood pressure is ranging in the 80s with a MAP in the mid 60s.  NEUROLOGIC:  The patient is currently denying any numbness in the lower   extremities.  Dorsiflexion, he can provide essentially 5/5 strength.  A little   bit of giveaway on the right, but eventually he could hold his foot up with   excellent strength.  Plantarflexion, similar.  He has good iliopsoas,   quadriceps on the left.  Iliopsoas on the right, there is 3/5, but he has   giveaway, adduction is 4/5, quadriceps is 4/5.     IMPRESSION:  Thoracic epidural abscess with some leg weakness; however, the   patient's current exam is not profoundly myelopathic.  With his cardiac   history, pulmonary history, the fact that he is coagulopathic, on Eliquis, has   a history  of thrombus in his heart, the patient is not a candidate for   emergency surgery at this point in time.  I have spoken with Dr. Walker, we   told that from my standpoint, this patient needs to be in the ICU with a-line,   pressors to maintain a mean arterial pressure of 80-85.  We need a cardiology   consult ASAP to discuss whether the patient's Eliquis can be held and/or   reversed in preparation for possible surgery.  The patient needs infectious   disease consult.  He has been started on Rocephin and vancomycin.  Apparently,   he had epidural abscess in the past in 2019, which was cured with IV   antibiotics.  I believe taking this patient to surgery for 3-4 hour   laminectomy would be a high risk and I would not do that unless I have a   cardiac clearance and the patient is no longer coagulopathic.  For the time   being, the patient can be treated medically and observed.  Neurosurgery   informed if there is any neurologic deterioration.        ______________________________  MD CINDY HARRIS/BONIFACIO/KENDALL    DD:  08/01/2022 23:26  DT:  08/02/2022 00:21    Job#:  [id number]

## 2022-08-03 PROBLEM — R09.02 HYPOXEMIA: Status: ACTIVE | Noted: 2022-01-01

## 2022-08-03 PROBLEM — I46.9 CARDIAC ARREST (HCC): Status: ACTIVE | Noted: 2022-01-01

## 2022-08-03 NOTE — CODE DOCUMENTATION
1632 patient went into vtach and was pulseless. CPR started, patient grunting and pushing at people. Stopped at 1633 patient in PEA CPR continued with pads in place. 1634 Sinus tachycardia 109/60. Dr. George called family and code status was changed. After discussion with cardiology decision to cardiovert was decided by 2 MD.

## 2022-08-03 NOTE — PROGRESS NOTES
Infectious Disease Progress Note    Author: Yara Kerr M.D. Date & Time of service: 8/3/2022  6:35 AM    Chief Complaint:  Atrial valve endocarditis, epidural abscess of thoracic spine    Interval History:      Review of Systems:  Review of Systems   Unable to perform ROS: Medical condition       Hemodynamics:  No data recorded.  Monitored Temp: 37.2 °C (98.96 °F)  Pulse  Av.5  Min: 0  Max: 225   Blood Pressure: (!) 87/53, Arterial BP: (!) 91/51  CVP (mm Hg): (!) 17 MM HG    Physical Exam:  Physical Exam  Cardiovascular:      Rate and Rhythm: Normal rate. Rhythm irregular.   Pulmonary:      Effort: Pulmonary effort is normal.      Breath sounds: Normal breath sounds.   Abdominal:      General: Abdomen is flat. Bowel sounds are normal.      Palpations: Abdomen is soft.   Musculoskeletal:         General: Normal range of motion.      Right lower leg: No edema.      Left lower leg: No edema.   Skin:     General: Skin is warm and dry.   Neurological:      Comments: Generally confused, not following or responsive to commands, moving all extremities   Psychiatric:         Mood and Affect: Mood normal.         Behavior: Behavior normal.         Meds:    Current Facility-Administered Medications:   •  norepinephrine (Levophed) infusion  •  insulin regular **AND** POC blood glucose manual result **AND** NOTIFY MD and PharmD **AND** Administer 20 grams of glucose (approximately 8 ounces of fruit juice) every 15 minutes PRN FSBG less than 70 mg/dL **AND** dextrose bolus  •  Pharmacy  •  senna-docusate **AND** polyethylene glycol/lytes **AND** magnesium hydroxide **AND** bisacodyl  •  acetaminophen  •  lactulose  •  midodrine  •  oxyCODONE immediate-release  •  pravastatin  •  sodium bicarbonate  •  EPINEPHrine (Adrenalin) infusion  •  MD Alert...Vancomycin per Pharmacy  •  budesonide-formoterol    Labs:  Recent Labs     22  2121 22  1520 22  0532   WBC 24.0* 23.7* 21.0*   RBC 4.80 4.80 4.94    HEMOGLOBIN 13.2* 13.2* 13.4*   HEMATOCRIT 38.4* 39.5* 40.9*   MCV 80.0* 82.3 82.8   MCH 27.5 27.5 27.1   RDW 51.8* 54.2* 54.5*   PLATELETCT 186 194 171   MPV 10.2 9.9 10.3   NEUTSPOLYS 94.90* 92.10* 91.50*   LYMPHOCYTES 0.00* 1.70* 2.50*   MONOCYTES 0.80 3.10 3.90   EOSINOPHILS 0.00 0.10 0.10   BASOPHILS 0.00 0.70 0.60   RBCMORPHOLO Present  --   --      Recent Labs     08/02/22 0600 08/02/22 1200 08/03/22  0532   SODIUM 132* 130* 130*   POTASSIUM 4.5 4.5 5.1   CHLORIDE 98 97 100   CO2 15* 13* 15*   GLUCOSE 142* 170* 189*   BUN 60* 70* 80*     Recent Labs     08/01/22 2132 08/02/22 0600 08/02/22 1200 08/03/22  0532   ALBUMIN 1.7* 2.0*  --   --    TBILIRUBIN 1.6* 1.7*  --   --    ALKPHOSPHAT 101* 106*  --   --    TOTPROTEIN 6.0 6.6  --   --    ALTSGPT 14 19  --   --    ASTSGOT 33 44  --   --    CREATININE 1.26 1.45* 1.81* 2.20*       Imaging:  CT-ABDOMEN-PELVIS W/O    Result Date: 8/2/2022 8/2/2022 10:47 AM HISTORY/REASON FOR EXAM:  Sepsis; right hip pain with endocarditis concerns for psoas abscess vs lytic lesion. TECHNIQUE/EXAM DESCRIPTION: CT scan of the abdomen and pelvis without contrast. Noncontrast helical scanning was obtained from the diaphragmatic domes through the pubic symphysis. Low dose optimization technique was utilized for this CT exam including automated exposure control and adjustment of the mA and/or kV according to patient size. COMPARISON: Outside CT abdomen and pelvis with contrast obtained 8/1/2022. FINDINGS: Please note that noncontrast CT is significantly limited in sensitivity for detecting solid organ abnormalities. Lower Chest: Bilateral basilar interstitial disease is present, right greater than left. There are trace bilateral pleural effusions. There is moderate cardiomegaly. Liver: Grossly normal. Spleen: Diminutive but otherwise grossly normal. Patient's known splenic laceration is not visualized on this exam. Pancreas: Unremarkable. Gallbladder: No calcified stones. Biliary:  Nondilated. Adrenal glands: Normal. Kidneys: Bilateral renal enhancement likely consistent with persistent nephrogram following outside facility contrast demonstration. No evidence of hydronephrosis. Bowel: No obstruction or acute inflammation. Lymph nodes: No adenopathy. Vasculature: Atherosclerosis. Peritoneum: Small amount of ascites. Musculoskeletal: Diffuse anasarca. No asymmetry of the psoas muscles to suggest abscess. Pelvis: Small amount of free fluid, presacral edema.     1.  No asymmetry of the psoas musculature to suggest abscess. Evaluation is limited in the absence of intravenous contrast administration. 2.  Bilateral basilar interstitial pulmonary disease. 3.  Trace bilateral pleural effusions. 4.  Moderate cardiomegaly. 5.  Diminutive spleen, otherwise unremarkable. Patient's suspected splenic laceration identified on the previous outside imaging is not visualized on this exam. 6.  Small amount of ascites. 7.  Persistent bilateral nephrograms suggestive of renal dysfunction. 8.  Atherosclerosis. 9.  Anasarca.    DX-CHEST-FOR LINE PLACEMENT Perform procedure in: Patient's Room    Result Date: 8/2/2022 8/2/2022 1:30 AM HISTORY/REASON FOR EXAM: CVC placement TECHNIQUE/EXAM DESCRIPTION:  PA and lateral views of the chest. COMPARISON: None FINDINGS: Right internal jugular central line is seen terminating in the superior vena cava.   Cardiomegaly is observed. The mediastinal contour appears within normal limits.  The central  pulmonary vasculature appears prominent and indistinct. Asymmetric elevation of the right hemidiaphragm is noted.  Diffuse scattered hazy pulmonary parenchymal opacities are seen. No significant pleural effusions are identified. The bony structures appear age-appropriate.     1.  Pulmonary edema and/or infiltrates. 2.  Cardiomegaly    DX-CHEST-PORTABLE (1 VIEW)    Result Date: 8/2/2022 8/2/2022 11:10 AM HISTORY/REASON FOR EXAM:  Shortness of Breath TECHNIQUE/EXAM DESCRIPTION AND  NUMBER OF VIEWS: Single portable view of the chest. COMPARISON: CT 8/2/2022 FINDINGS: Right central venous catheter with tip in the SVC. Interstitial and groundglass opacities throughout both lungs, right more than left. Small bilateral pleural effusions. No pneumothorax. Stable cardiopericardial silhouette.     Interstitial and groundglass opacity throughout both lungs, right more than left, could relate to interstitial lung disease. Superimposed infection cannot be excluded. Small bilateral pleural effusions.    US-RUQ    Result Date: 8/2/2022 8/2/2022 4:42 AM HISTORY/REASON FOR EXAM:  Abnormal Labs, Abdominal pain TECHNIQUE/EXAM DESCRIPTION AND NUMBER OF VIEWS:  Real-time sonography of the liver and biliary tree. COMPARISON: None FINDINGS: The liver is irregular in contour. There is no evidence of solid mass lesion. The liver measures 14.7 cm. Gallbladder sludge is seen. There is no evidence of cholelithiasis.  The gallbladder wall thickness measures 3.6 mm. There is no pericholecystic fluid. The common duct measures 6.5 mm. The pancreas is obscured by bowel gas. The visualized aorta is normal in caliber. Intrahepatic IVC is patent. The portal vein is patent with hepatopetal flow. The MPV measures 1.0 cm. The right kidney measures 10.3 cm. Perihepatic collection of ascites is seen Small right pleural effusion is seen.     1.  Gallbladder sludge with thickened gallbladder wall, nonspecific findings in the setting of hepatocellular disease, consider acalculous cholecystitis. Could be further evaluated with HIDA scan as clinically appropriate. 2.  Common bile duct dilatation, consider causes of biliary obstruction with additional workup as clinically appropriate. 3.  Perihepatic ascites 4.  Small right pleural effusion    EC-ECHOCARDIOGRAM COMPLETE W/ CONT    Result Date: 8/2/2022  Transthoracic Echo Report Echocardiography Laboratory CONCLUSIONS No prior study is available for comparison. Mildly dilated left  ventricular cavity size with severely reduced systolic function.  Visually estimated LVEF of 25-30%.  Severe hypokinesis to akinesis of the anteroseptal and apical walls.  Layering mural thrombus seen in apex.  Trileaflet aortic valve with echogenic density 1 x 1.5 cm attached to right coronary cusp on the LVOT side that can represent either vegetation or thrombus.  Moderate eccentric aortic regurgitation. Mild mitral and tricuspid regurgitation. Consider MARISA to better assess aortic valve and possible vegetation/thrombus if clinically indicated. RUBY DA SILVA Exam Date:         2022                    02:52 Exam Location:     Inpatient Priority:          Stat Ordering Physician:        TATIANNA MARTINEZ Referring Physician: Sonographer:               Jeimy English [facility] Age:    61     Gender:    M MRN:    [id number] :    1960 BSA:    2.17   Ht (in):    72     Wt (lb):    208 Exam Type:     Complete, Contrast Indications:     Congestive Heart Failure ICD Codes:       428 CPT Codes:       76067 BP:   100    /   57     HR:   84 Technical Quality:       Fair MEASUREMENTS  (Male / Female) Normal Values 2D ECHO LV Diastolic Diameter PLAX        5.9 cm                4.2 - 5.9 / 3.9 - 5.3 cm LV Systolic Diameter PLAX         3.9 cm                2.1 - 4.0 cm IVS Diastolic Thickness           0.9 cm                LVPW Diastolic Thickness          1 cm                  LVOT Diameter                     2.3 cm                Estimated LV Ejection Fraction    25 %                  LV Ejection Fraction MOD BP       49.1 %                >= 55  % LV Ejection Fraction MOD 4C       46.8 %                LV Ejection Fraction MOD 2C       45.8 %                IVC Diameter                      2.1 cm                DOPPLER AV Peak Velocity                  1.4 m/s               AV Peak Gradient                  8 mmHg                AV Mean Gradient                  5 mmHg                LVOT Peak Velocity                 0.78 m/s              AV Area Cont Eq vti               2.5 cm2               Mitral E Point Velocity           0.76 m/s              Mitral E to A Ratio               2                     MV Pressure Half Time             33.1 ms               MV Area PHT                       6.6 cm2               MV Deceleration Time              114 ms                TR Peak Velocity                  277 cm/s              PV Peak Velocity                  0.58 m/s              PV Peak Gradient                  1.3 mmHg              RVOT Peak Velocity                0.42 m/s              * Indicates values subject to auto-interpretation LV EF:  25    % FINDINGS Left Ventricle Contrast was used to evaluate for thrombus in the left ventricular apex. Large layering mural thrombus is observed in the left ventricular apex.  3 _ mL of contrast was administered. Existing IV was used.  Severely hypokinetic to akinetic anteroseptal and apical walls. The left ventricle is mildly dilated. Severely reduced left ventricular systolic function. The left ventricular ejection fraction is visually estimated to be 25-30%.  Restrictive mitral inflow velocity.  Gross calculation based on LVOT VTI and LVOT diameter of 2.3 cm, cardiac output is approximately 4 L/min. Right Ventricle Normal right ventricular size and low normal systolic function. Right Atrium Normal right atrial size. Dilated inferior vena cava without inspiratory collapse. Left Atrium Mildly dilated left atrium. Left atrial volume index is 35 mL/sq m. Mitral Valve Thickened mitral valve leaflets. Mild mitral regurgitation. No mitral stenosis. Aortic Valve Trileaflet aortic valve.  There is an echogenic density 1 x 1.5 cm attached to right coronary cusp on the LVOT side that can represent either vegetation/thrombus.  Moderate eccentric aortic insufficiency. Tricuspid Valve Structurally normal tricuspid valve. Mild tricuspid regurgitation. Right atrial pressure is estimated to be 15  "mmHg. Estimated right ventricular systolic pressure is 34 mmHg. Pulmonic Valve Structurally normal pulmonic valve without significant stenosis and trace regurgitation. Pericardium Normal pericardium without effusion. Aorta Normal aortic root for body surface area. The ascending aorta is not well visualized. Jackelyn Latham MD (Electronically Signed) Final Date:     02 August 2022                 04:24    DX-ABDOMEN FOR TUBE PLACEMENT    Result Date: 8/2/2022 8/2/2022 4:01 PM HISTORY/REASON FOR EXAM:  Tube evaluation. TECHNIQUE/EXAM DESCRIPTION AND NUMBER OF VIEWS:  1 view(s) of the abdomen. COMPARISON:  None. FINDINGS: Limited single view of the abdomen performed primarily to evaluate enteric tube position. The tip projects over the expected area of the stomach. Gaseous distention of the bowel     Feeding tube with tip projecting over the expected area of the stomach.      Micro:  Results     Procedure Component Value Units Date/Time    BLOOD CULTURE [[id number]]  (Abnormal) Collected: 08/01/22 2121    Order Status: Completed Specimen: Blood from Peripheral Updated: 08/02/22 1409     Significant Indicator POS     Source BLD     Site PERIPHERAL     Culture Result Growth detected by Bactec instrument. 08/02/2022  14:05  Gram Stain: Gram positive cocci: Possible Staphylococcus sp.      Narrative:      CALL  [facility] tel. [phone],  CALLED  [facility] tel. [phone] 08/02/2022, 14:08, RB PERF. RESULTS CALLED TO: RN  [id number]  Per Hospital Policy: Only change Specimen Src: to \"Line\" if  specified by physician order.  Brachial    BLOOD CULTURE [[id number]]  (Abnormal) Collected: 08/01/22 2132    Order Status: Completed Specimen: Blood from Peripheral Updated: 08/02/22 1352     Significant Indicator POS     Source BLD     Site PERIPHERAL     Culture Result Growth detected by Bactec instrument. 08/02/2022  13:48  Gram Stain: Gram positive cocci: Possible Staphylococcus sp.  Methicillin Resistant Staphylococcus aureus (MRSA)  detected " "by PCR.  Susceptibility to follow.      Narrative:      CALL  [facility] tel. [phone],  CALLED  [facility] tel. [phone] 08/02/2022, 13:51, RB PERF. RESULTS CALLED TO: RN  [id number]  Per Hospital Policy: Only change Specimen Src: to \"Line\" if  specified by physician order.  Right Hand    MRSA By PCR (Amp) [[id number]] Collected: 08/02/22 0730    Order Status: Completed Specimen: Respirate from Nares Updated: 08/02/22 1142     MRSA by PCR POSITIVE    Narrative:      Collected By: [id number] KOTA SINGH  Per [facility] Lab Protocol  Calling [facility]    Blood Culture [[id number]]     Order Status: Canceled Specimen: Blood from Peripheral     Blood Culture [[id number]]     Order Status: Canceled Specimen: Blood from Peripheral           Assessment:  Active Hospital Problems    Diagnosis    • *Abscess in epidural space of thoracic spine [G06.1]    • Heart failure with reduced ejection fraction (HCC) [I50.20]    • Acute bacterial endocarditis [I33.0]    • Encephalopathy [G93.40]    • Cirrhosis of liver with ascites ([facility]) [K74.60, R18.8]    • JOANA (acute kidney injury) ([facility]) [N17.9]    • Pleural effusion, bilateral [J90]    • Goals of care, counseling/discussion [Z71.89]    • Pulmonary fibrosis ([facility]) [J84.10]    • T2DM (type 2 diabetes mellitus) ([facility]) [E11.9]    • Septic shock ([facility]) [A41.9, R65.21]    • Mural thrombus of heart [I51.3]    • Lactic acidosis [E87.2]    • Hypo-osmolar hyponatremia [E87.1]      Interval 24 hours:      AF, O2 15 L oxymask, pressors down to one   Labs reviewed  Imaging personally reviewed both images and report.   Micro reviewed    Patient with PEA code arrest yesterday, ROCS after 1 round of CPR.  Antibiotics transition from vancomycin to daptomycin due to kidney failure.      ASSESSMENT/PLAN:      61 y.o.  admitted 8/1/2022. Pt has a past medical history of HFrEF 20-25%, ventricular thrombus on Eliquis and pulm fibrosis on home oxygen.  Initially seen at [facility] where per notes he was found to have " an epidural abscess at T2/T9 and transferred for neurosurgical consultation.  Neurosurgery evaluating and no plans for emergency surgery.  Echocardiogram has been done with finding of vegetation on the aortic valve.       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  Moderate aortic regurgitation.  Layering mural thrombus seen in apex  Thoracic spine epidural abscess  JOANA  Prior epidural abscess per notes, will need to clarify  HFrEF, ~25%  Ventricular thrombus and has been on Eliquis  Pulmonary fibrosis on home oxygen     Plan      --- Stop vancomycin and transition to daptomycin  8 mg/kg q24 - if renal function worsens will need to adjust frequency to q 48 hours  --- Hold statin  --- CPK for baseline   --- Follow-up blood cultures- repeat ordered   --- Recommend MARISA to better evaluate thrombus versus infective vegetation if in keeping with goals of care   --- Monitor for any new focal neurologic symptoms  --- Neurosurgery following  --- Monitor labs    Prognosis poor      Dispo: TBD  PICC: Yes, once blood cultures have cleared for 72 hours

## 2022-08-03 NOTE — PROGRESS NOTES
Code Blue Summary     Code Blue initiated at: 1632  Initial rhythm: VT  Subsequent rhythms: PEA, Vtach, SVT  Interventions: CPR, Defibrillation and Synchronized Cardioversion  Medications administered: Dilaudid  Total rounds of CPR: 1  Patient in ICU, ROSC 1634  Outcome: Total time of code: 2 minutes

## 2022-08-03 NOTE — CARE PLAN
The patient is Unstable - High likelihood or risk of patient condition declining or worsening    Shift Goals  Clinical Goals: MAP 60-65  Patient Goals: JUAN  Family Goals: JUAN    Progress made toward(s) clinical / shift goals:  Epi gtt titrated from 0.2 to 0.12 during this shift.    Patient is not progressing towards the following goals:

## 2022-08-03 NOTE — PROGRESS NOTES
1000 - Dr. Ballesteros rounding with multidisciplinary team. Per MD, ok to hold off on drawing blood cultures at this time. MD and Palliative Care to meet with family today.

## 2022-08-03 NOTE — DISCHARGE SUMMARY
Death Summary    Cause of Death  Septic shock due to aortic valve endocarditis complicated by cardiogenic shock, thoracic epidural abscess, and methicillin resistant staphylococcus bacteremia    Comorbid Conditions at the Time of Death  Principal Problem:    Abscess in epidural space of thoracic spine POA: Yes  Active Problems:    Pulmonary fibrosis (HCC) POA: Yes    T2DM (type 2 diabetes mellitus) (HCC) POA: Yes    Septic shock (HCC) POA: Yes    Mural thrombus of heart POA: Yes    Lactic acidosis POA: Yes    Hypo-osmolar hyponatremia POA: Yes    Heart failure with reduced ejection fraction (HCC) POA: Unknown    Acute bacterial endocarditis POA: Unknown    Encephalopathy POA: Unknown    Cirrhosis of liver with ascites (HCC) POA: Unknown    JOANA (acute kidney injury) (HCC) POA: Unknown    Pleural effusion, bilateral POA: Unknown    Goals of care, counseling/discussion POA: Unknown    Cardiac arrest (HCC) POA: Unknown    Hypoxemia POA: Unknown  Resolved Problems:    Microcytic anemia POA: Yes      History of Presenting Illness and Hospital Course  This is a 61 y.o. male admitted 8/1/2022 after being transferred from [facility] for higher level of care for epidural abscess. Patient with hx of DM, HFrEF found to be 20-25% with wall motion abnormalities consistent with ischemic cardiomyopathy, ventricular thrombus on eliquis, pulmonary fibrosis on home oxygen, as described was found to have epidural abscess T2-T9 and transferred to ICU the night of 8/1 for acute management and MAP augmentation for spinal cord perfusion. Has a hx of prior spinal infection in 2019 that was medically managed. Cardiology was consulted for operative clearance, a  stat echo found the patient to have AV endocarditis and moderate AR. He was also found to have significant liver disease with elevated INR and T bili with ascites on abdominal CT. Blood cultures grew MRSA quickly and ID was consulted for the above problems. He was a poor surgical  candidate due to his comorbid conditions and medical management was pursued. He qucikly had progressive multiple organ failure within in hours of admission and then suffered cardiac arrest with cpr and shock delivered . Prior to this family was called and explained his critical nature and unlikely to survive to hospital discharge and to consider DNR/DNI. After his cardiac arrest he was changed to DNR/DNI with the family all coming to bedside. He continued to have progressive multiple organ failure and anuria and on the morning of  suffered a vfib arrest and  suddenly. He was given morphine at this time and daughter was called back to room. Our deepest condolences were given and thanked her for us to be involved in his care.     Patient pronounce by myself at 10:10 am 2022    Pro Ballesteros MD  Critical Care Medicine

## 2022-08-03 NOTE — ASSESSMENT & PLAN NOTE
Acute on chronic due to pulmonary fibrosis, volume overload due to sepsis and moderate AR and EF of 20%  Trial of force diuresis  If patient was to worsen from respiratory mechanics would recommend palliative approached

## 2022-08-03 NOTE — PROGRESS NOTES
Critical Care Progress Note    Date of admission  8/1/2022    Chief Complaint  61 y.o. male admitted 8/1/2022 with hx of HFrEF found to be 20-25%, ventricular thrombus on eliquis, pulmonary fibrosis on home oxygen, found to have epidural abscess T2-T9 at [facility] and the [facility] and transfer here for neurosurgical consultation was admitted to floor and transferred to ICU for acute management and BP augementation. Has a hx of prior spinal infection in 2019. Cardiology consulted s/p stat echo found to have endocarditis and vegetation on aortic valve with moderate AR.     Hospital Course  Admitted to ICU 8/1 for epidural abscess, sepsis and map augmentation  8/2 multidisciplinary discussion with cardiology, neurosurgery and CCM we decided to lower map goal to 70, worsening mental status and multiple organ failure, ID consultation, suffer cardiac arrest changed to DNR/DNI    Interval Problem Update  Reviewed last 24 hour events:  Family not quite ready for comfort care  Neuro: did awake this am GCS 9 pulling off oxygen  HR: 80-90's  SBP: map now 60-65 0.2 of epi gtt  Tmax: afebrile  GI: NPO, BM pta  UOP: 30ml only overnight  Lines: central line, artline, jones  Resp: 10l oxy mask   Vte: contra  PPI/H2:n/a  Antibx: MRSA bacteremia, endocarditis, epidural abscess on vancomycin -> daptomycin    MAP 60-65 epi gtt at 0.2mcg/kg/min  Lasix 80mg IV    Family discussion with daughter at bedside she was planning on going to comfort care but now he's more awake which is making it hard.     Review of Systems  Review of Systems   Unable to perform ROS: Medical condition        Vital Signs for last 24 hours   Pulse:  [0-225] 91  Resp:  [7-26] 22  BP: ()/(33-62) 73/49  SpO2:  [75 %-100 %] 100 %    Hemodynamic parameters for last 24 hours  CVP:  [13 MM HG-24 MM HG] 17 MM HG    Respiratory Information for the last 24 hours       Physical Exam   Physical Exam  Vitals and nursing note reviewed.   Constitutional:       General: He is not in  acute distress.     Appearance: He is ill-appearing.      Comments: Sitting up in bed chronically ill appearing, with family at bedside   HENT:      Head: Normocephalic.      Mouth/Throat:      Mouth: Mucous membranes are moist.   Eyes:      Pupils: Pupils are equal, round, and reactive to light.   Cardiovascular:      Rate and Rhythm: Tachycardia present. Rhythm irregular.      Heart sounds: No murmur heard.  Pulmonary:      Effort: No respiratory distress.      Breath sounds: No stridor. Rales present. No wheezing or rhonchi.      Comments: Rales with abnormal chest wall movement, poor cough and secretions  Abdominal:      General: There is no distension.      Palpations: There is no mass.      Tenderness: There is no abdominal tenderness.      Hernia: No hernia is present.   Musculoskeletal:         General: Swelling present. No tenderness, deformity or signs of injury.      Comments: Pitting edema to sacrum and lower ext   Skin:     Coloration: Skin is pale.      Comments: Cold ext to all finger and toes no more mottling to knees   Neurological:      Mental Status: He is alert.      Comments: Awakes looking around, lethargic, not following commands, poor cough, moves ext taking off his mask, not following commands         Medications  Current Facility-Administered Medications   Medication Dose Route Frequency Provider Last Rate Last Admin   • DAPTOmycin (CUBICIN) 750 mg in NS 50 mL IVPB  750 mg Intravenous Q24HRS Yara Kerr M.D. 100 mL/hr at 08/03/22 0913 750 mg at 08/03/22 0913   • furosemide (LASIX) injection 80 mg  80 mg Intravenous Once Pro Ballesteros M.D.       • norepinephrine (Levophed) 8 mg in 250 mL NS infusion (premix)  0-30 mcg/min Intravenous Continuous Julia Walker M.D.   Stopped at 08/03/22 0400   • insulin regular (HumuLIN R,NovoLIN R) injection  1-6 Units Subcutaneous Q6HRS Julia Walker M.D.   1 Units at 08/03/22 0544    And   • dextrose 50% (D50W) injection 25 g  25 g  Intravenous Q15 MIN PRN Julia Walker M.D.       • Pharmacy Consult: Enteral tube insertion - review meds/change route/product selection  1 Each Other PHARMACY TO DOSE Pro Ballesteros M.D.       • senna-docusate (PERICOLACE or SENOKOT S) 8.6-50 MG per tablet 2 Tablet  2 Tablet Enteral Tube BID Pro Ballesteros M.D.        And   • polyethylene glycol/lytes (MIRALAX) PACKET 1 Packet  1 Packet Enteral Tube QDAY PRN Pro Ballesteros M.D.        And   • magnesium hydroxide (MILK OF MAGNESIA) suspension 30 mL  30 mL Enteral Tube QDAY PRN Pro Ballesteros M.D.        And   • bisacodyl (DULCOLAX) suppository 10 mg  10 mg Rectal QDAY PRN Pro Ballesteros M.D.       • acetaminophen (Tylenol) tablet 650 mg  650 mg Enteral Tube Q4HRS PRN Pro Ballesteros M.D.       • oxyCODONE immediate-release (ROXICODONE) tablet 5 mg  5 mg Enteral Tube Q4HRS PRN Pro Ballesteros M.D.       • EPINEPHrine (Adrenalin) infusion 4 mg/250 mL (premix)  0-0.2 mcg/kg/min Intravenous Continuous Julia Walker M.D. 70.8 mL/hr at 08/03/22 0922 0.2 mcg/kg/min at 08/03/22 0922   • budesonide-formoterol (SYMBICORT) 80-4.5 MCG/ACT inhaler 2 Puff  2 Puff Inhalation BID (RT) Santos Banda D.O.           Fluids    Intake/Output Summary (Last 24 hours) at 8/3/2022 1109  Last data filed at 8/3/2022 1000  Gross per 24 hour   Intake 2303.89 ml   Output 37 ml   Net 2266.89 ml       Laboratory  Recent Labs     08/01/22  2121 08/02/22  1414   QCXIO56O 7.44  --    [id number] 28.2  --    [id number] 83.0  --    [id number] 95.0  --    ARTHCO3 19  --    ARTBE -4  --    ISTATAPH  --  7.234*   ISTATAPCO2  --  37.9*   ISTATAPO2  --  92*   ISTATATCO2  --  17*   [id number]  --  96   ISTATARTHCO3  --  16.0*   ISTATARTBE  --  -11*   ISTATTEMP  --  96.6 F   ISTATSPEC  --  Arterial   ISTATAPHTC  --  7.249*   [id number]  --  86     Recent Labs     08/03/22  0532   CPKTOTAL 45     Recent Labs     08/01/22  2132 08/02/22  0600 08/02/22  1200 08/03/22  0532   SODIUM  130* 132* 130* 130*   POTASSIUM 3.9 4.5 4.5 5.1   CHLORIDE 99 98 97 100   CO2 16* 15* 13* 15*   BUN 54* 60* 70* 80*   CREATININE 1.26 1.45* 1.81* 2.20*   MAGNESIUM 2.0 2.3  --   --    CALCIUM 7.3* 7.4* 7.2* 6.8*     Recent Labs     08/01/22 2132 08/02/22 0600 08/02/22  1200 08/03/22  0532   ALTSGPT 14 19  --   --    ASTSGOT 33 44  --   --    ALKPHOSPHAT 101* 106*  --   --    TBILIRUBIN 1.6* 1.7*  --   --    GLUCOSE 104* 142* 170* 189*     Recent Labs     08/01/22 2121 08/01/22 2132 08/02/22 0600 08/02/22  1520 08/03/22  0532   WBC 24.0*  --   --  23.7* 21.0*   NEUTSPOLYS 94.90*  --   --  92.10* 91.50*   LYMPHOCYTES 0.00*  --   --  1.70* 2.50*   MONOCYTES 0.80  --   --  3.10 3.90   EOSINOPHILS 0.00  --   --  0.10 0.10   BASOPHILS 0.00  --   --  0.70 0.60   ASTSGOT  --  33 44  --   --    ALTSGPT  --  14 19  --   --    ALKPHOSPHAT  --  101* 106*  --   --    TBILIRUBIN  --  1.6* 1.7*  --   --      Recent Labs     08/01/22 2121 08/01/22 2132 08/02/22 0720 08/02/22 1520 08/03/22  0532   RBC 4.80  --   --  4.80 4.94   HEMOGLOBIN 13.2*  --   --  13.2* 13.4*   HEMATOCRIT 38.4*  --   --  39.5* 40.9*   PLATELETCT 186  --   --  194 171   PROTHROMBTM  --  34.5* 34.5*  --   --    APTT  --  36.3*  --   --   --    INR  --  3.60* 3.59*  --   --        Imaging  X-Ray:  I have personally reviewed the images and compared with prior images.  CT:    Reviewed  Echo:   Reviewed  MRI:   Reviewed    Assessment/Plan  * Abscess in epidural space of thoracic spine- (present on admission)  Assessment & Plan  Neurosurgery consulting patient is poor operative candidate  Medical management   ID consulting  Serial neurologic exam for worsening spinal cord compression  Map goal of > 70 after discussing with NSG, cardiology and to maximize perfusion to spinal cord with his poor LV and Mod AR  No IR intervention    Patient suffered cardiac arrest 8/2   Thus map goal changes to 60-65  Prognosis poor with no source control and patient would not  tolerate surgery        Hypoxemia  Assessment & Plan  Acute on chronic due to pulmonary fibrosis, volume overload due to sepsis and moderate AR and EF of 20%  Trial of force diuresis  If patient was to worsen from respiratory mechanics would recommend palliative approached     Cardiac arrest (HCC)  Assessment & Plan  VTach in nature 8/3 multiple shock delivered  Likely driven by force map goal and epinephrine with underlying severely reduced EF and cardiac ischemic disease    Changed map goal to 60-65  DNR/DNI  Palliative approach recommended    Goals of care, counseling/discussion  Assessment & Plan  Called spoke with family 8/2 patient has a very concerning diagnosis and multiple comorbid conditions and unlikely to survive hospital discharge  Recommended against CPR and short intubation limit trial. Ashley daughter appreciate discussion and will discuss with family about patient wishes since he is unable to help us at this time with his encephalopathy    Palliative care consulted   Continue good communication with family and update them on patient trajectory  Patient suffered cardiac arrest 8/2    Spoke again with family at bedside she was going to start comfort care this morning but with him awakening she would like to see if she could have a chance for him to awake more. She did agree if patient worsening from respiratory or uncomfortable will transition to comfort measures as she doesn't want him to suffer.     Pleural effusion, bilateral  Assessment & Plan  Likely chronic due to heart failure  Monitor need for diagnostic and or drainage    JOANA (acute kidney injury) ([facility])  Assessment & Plan  Maintain euvolemia and monitor fluid responsiveness (avoid NaCL and renal congestion)  MAP > 70 uses pressors or inotropic trial  Monitor urine output and I&O's  Avoid and review nephrotoxin medication  Rule out post obstruction -> ct abdomen negative  Consider renal U/S if no renal images  U/a and CPK  Worsening  Trial of  lasix 8/3    Cirrhosis of liver with ascites (HCC)  Assessment & Plan  T Bili 1.7, INR 3.5 with ascites on ct scan  Unknown alcohol hx  Check hepatitis panel -> negative  Avoid hepatoxins  lactulose-> unable to safely swallow and family request no cortrac  Possible cardiac in etiology    Encephalopathy  Assessment & Plan  Metabolic, septic and cardiogenic in nature  Limit sedative  Aspiration precautions      Acute bacterial endocarditis  Assessment & Plan  Vegetation seen on AV  With GPC bacteremia  ID consultation  Serial blood cx      Heart failure with reduced ejection fraction (HCC)  Assessment & Plan  EF 20% with asymmetric wall motions likely ischemic disease  Mod AR  Continue inotrope w/ epinephrine to support MAP and maintain HR to 's and to prevent regurgitation of AR   Limit pure afterload agents and reduce map or go off SBP goal 100-120's  Use norepinephrine for map goal in addition to epi as above  Try to limit fluids and is already volume overloaded with ascites and pleural effusions  Suffered VT arrest 8/2   Patient with map goal changed his extremities has had improved warmth    Prognosis poor palliative care consultation  Not a cardiac cath candidate with his potential surgical needs  Trial of force diuresis    Mural thrombus of heart- (present on admission)  Assessment & Plan  Chronic in nature holding anticoagulation with potential need for operative intervention  Restart when able  INR 3.5 with stable TEG likely related to liver disease  Eliquis was not reversed on admission    Septic shock (HCC)- (present on admission)  Assessment & Plan  This is Septic shock Present on admission  SIRS criteria identified on my evaluation include: Tachycardia, with heart rate greater than 90 BPM and Leukocytosis, with WBC greater than 12,000  Indicators of septic shock include: Severe sepsis present and persistent hypotension after 30 ml/kg completed.   Sources is: bacteremia, epidural abscess and  endocarditis  Sepsis protocol initiated  Crystalloid Fluid Administration: Patient has advanced or end-stage heart failure (with documentation of NYHA class III or symptoms with minimal exertion, Or NYHA class IV or symptoms at rest or with activity), for this/these reason(s) it is unsafe for this patient to receive 30 mL/kg of fluid  Partial Fluid Dose Given: 10 mL/kg per current or ideal body weight, patient to be reassessed shortly after completion of partial fluid bolus to ensure adequacy of resuscitation  IV antibiotics as appropriate for source of sepsis  Reassessment: I have reassessed the patient's hemodynamic status    ID consultation   Poor surgical candidate thus source control will be difficult      T2DM (type 2 diabetes mellitus) ([facility])- (present on admission)  Assessment & Plan  Tight glucose control to help promote infection control  Sliding scale coverage with hypoglycemic protocol  HgA1C    Pulmonary fibrosis (HCC)- (present on admission)  Assessment & Plan  Hx of which make even harder due to his poor pulmonary reserve  Careful with volume expansion  Monitor need for mechanical support with Bipap or Intubation       VTE:  Contraindicated and SCD's  Ulcer: Not Indicated  Lines: Central Line  Ongoing indication addressed, Arterial Line  Ongoing indication addressed and Rodriguez Catheter  Ongoing indication addressed    I have performed a physical exam and reviewed and updated ROS and Plan today (8/3/2022). In review of yesterday's note (8/2/2022), there are no changes except as documented above.     Discussed patient condition and risk of morbidity and/or mortality with Family, RN, RT, Pharmacy, Code status disscussed, Charge nurse / hot rounds, Patient and cardiology, infectious disease and neurosurgery    The patient remains critically ill on epinephrine gtt and high oxygen levels and family discussions.  Critical care time = 55 minutes in directly providing and coordinating critical care and extensive  data review.  No time overlap and excludes procedures.

## 2022-08-03 NOTE — CONSULTS
Reason for PC Consult: Advance Care Planning    Consulted by: Dr. Walker  Assessment:  General: Patient is a 61-year-old male with past medical history of chronic hypoxic respiratory failure secondary to pulmonary fibrosis, mural thrombus on Eliquis, and chronic heart failure with reduced ejection fraction who presents as a transfer [facility] for thoracic epidural abscess. Admitted to ICU 8/1 for epidural abscess, sepsis and map augmentation 8/2 multidisciplinary discussion with cardiology, neurosurgery, worsening mental status and multiple organ failure, ID consultation, suffer cardiac arrest 8/2 changed to DNR/DNI. Generally confused, not following or responsive to commands, moving all extremities. PMH: MRSA bacteremia, epidural abscess, not a surgical candidate, AV endocarditis with moderate AR, ischemic cardiomyopathy, acute on chronic systolic heart failure, JOANA, Hyponatremia, lactic acidosis with metabolic acidosis, Encephalopathy     Social: Pt was living with his sister Christopher, in [place], his two daughter Angel Ramirez. Per Ashley she lost her mother as well several yrs ago under comfort care in a similar situation due to her diabetes and heart. Ashley has a  6 yr old daughter that he is very close with Ramy has Downs syndrome     Consults: infectious dieses, cardiology, neurosurgery, critical care    Dyspnea: No-    Last BM:  (PTA)-    Pain: Unable to determine-    Depression: Unable to determine-    Dementia: No;       Spiritual:  Is Yazdanism or spirituality important for coping with this illness? Unable to determine-    Has a  or spiritual provider visit been requested? Unable to determine    Palliative Performance Scale: 10%    Advance Directive: None-    DPOA: No-    POLST: No-      Code Status: DNR-      Outcome: Appreciate Dr. Ballesteros's updates, met with Ashley and her  at bedside. Dr. Ballesteros had just been in to speak with them. Introduced self and role of Palliative care.  "Ashley stated that after speaking with Dr. Walker last evening her and her sister and pt's sister were ready for CC then am however pt's was slightly more arousable and appeared to recgonize Ashley and her . Ashley stated that she is a daddy's girl and this is very difficult decision. She does have a good understanding of what brought him into the hospital and stated that he was feeling poor for several weeks prior. She does not drive so she was not able to see him when he was having back issues and talked about his breathing issues. She told him on multiple occasions to go to the hospital. He finally did, but he was poor at keeping up with his health until it was too late. Ashley understands from the MD's that his prognosis is extremely poor and quality of life will be decreased with the real possibility of paralysis. Explained that he is in complete organ failure and worsening respiratory function with cardiac function compromised as well as kidney function.     Explored pt's beliefs and preferences with his daughter to determine GOC. Explained comfort care measures to help maintain pt's comfort and not using medications as a aggressive means to keep pt going or oxygen to sustain life, using pain medications and or antianxiety medications to alleviate any distress pt was experiencing. Ashley asked about the oxygen mask that he keeps trying to pull it off, explained that his mentation is effected do to the rest of the organs that are shutting down. He may seem more awake but during assessment he was not able to recognize, occasionally opened hs eyes, delirium noted and explained. Reassured her that he knows that she is there and with him even if he can't communicate that understanding. Ashley was tearful. \"I need more time\" I just am having a hard time letting go. PC RN explained the transition of the pt's body and how it compensates which can be uncomfortable. Support provided and stated that we will continue to " provide care with the hopes he does not decline further.     Active listening, reflection, validation and normalization and empathic support provided throughout the encounter. All questions answered and PC contact information provided.     Updated: Dr. Ballesteros    Plan: continue to provide support to family as needed.     Thank you for allowing Palliative Care to participate in this patient's care. Please feel free to call [phone] with any questions or concerns.

## 2022-08-03 NOTE — DIETARY
"Nutrition Support Assessment:  Day 2 of admit.  Ramy Clark is a 61 y.o. male with admitting DX of epidural abscesses.     Current problem list:  1. Cardiac Arrest  2. Hypoxemia  3. HF with reduced EF  4. Acute bacterial endocarditis  5. Encephalopathy  6. Cirrhosis of liver with ascites  7. JOANA  8. Pleural effusion, bilateral  9. Abscess in epidural space of thoracic spine  10. Pulmonary fibrosis  11. T2DM  12. Septic shock  13. Mural thrombus of heart  14. Lactic acidosis  15. Hypo-osmolar hyponatremia     Assessment:  Estimated Nutritional Needs based on:   Height: 177.8 cm (5' 10\")  Weight: 94.4 kg (208 lb 1.8 oz) - bed scale  Weight to Use in Calculations: 94.4 kg (208 lb 1.8 oz)  Body mass index is 29.86 kg/m²., BMI classification: overweight    Calculation/Equation: MSJ x 1.2 = 2108 kcals  Total Calories / day: 2100 - 2300  (Calories / k - 24)  Total Grams Protein / day: 94  (Grams Protein / k.0)     Evaluation:   1. Consult received for TF.  2. Gastric cortrak placed and confirmed on KUB for enteral access.  3. Pt is on 15 L oxymask.   4. Palliative care following, saw today and pt is DNR/I with multi organ failure.  5. Skin: No wounds. 3+ severe edema to right upper and low extremities.   6. Labs: Na 130, glucose 189, BUN 80, Creat 2.20, GFR 33, A1C 7.3  7. Meds: daptomycin, SSI, colace, bowel protocol, Epi at 0.14 mcg/kg/min  8. Last BM: PTA  9. Renal concentrated formula appropriate at this time.      Malnutrition Risk: Unable to assess at this time.     Recommendations/Plan:  1. Start TF Novasource Renal at 25 ml/hr and advance per protocol to goal rate of 45 ml/hr to provide 2160 kcals, 98 gm protein, and 778 ml free water per day.  2. Fluids per MD. SALDIVAR following.              "

## 2022-08-03 NOTE — PROGRESS NOTES
Neurosurgery Progress Note    Subjective:  Cardiac arrest yesterday- DNR/DNI with initially plans for comfort care    Exam:  EO spont- pushing off mask with UE  Does not follow- Min WD in BLE/Spont UE    BP  Min: 33/33  Max: 101/56  Pulse  Av.8  Min: 0  Max: 225  Resp  Av  Min: 7  Max: 26  Monitored Temp 2  Av.8 °C (98.2 °F)  Min: 34.8 °C (94.64 °F)  Max: 37.4 °C (99.32 °F)  SpO2  Av %  Min: 75 %  Max: 100 %    No data recorded    Recent Labs     22  21222  1520 22  0532   WBC 24.0* 23.7* 21.0*   RBC 4.80 4.80 4.94   HEMOGLOBIN 13.2* 13.2* 13.4*   HEMATOCRIT 38.4* 39.5* 40.9*   MCV 80.0* 82.3 82.8   MCH 27.5 27.5 27.1   MCHC 34.4 33.4* 32.8*   RDW 51.8* 54.2* 54.5*   PLATELETCT 186 194 171   MPV 10.2 9.9 10.3     Recent Labs     22  0600 22  1200 22  0532   SODIUM 132* 130* 130*   POTASSIUM 4.5 4.5 5.1   CHLORIDE 98 97 100   CO2 15* 13* 15*   GLUCOSE 142* 170* 189*   BUN 60* 70* 80*   CREATININE 1.45* 1.81* 2.20*   CALCIUM 7.4* 7.2* 6.8*     Recent Labs     22  2132 22  0720   APTT 36.3*  --    INR 3.60* 3.59*     Recent Labs     22  0200   REACTMIN 7.2   CLOTKINET 1.1   CLOTANGL 75.1   MAXCLOTS 64.8   PRCINADP 67.9*   PRCINAA 48.3*       Intake/Output                       22 07 - 22 0659 22 - 22 0659      Total [id number] Total                 Intake    I.V.  477.3  1572.1 2049.4  283.2  -- 283.2    Vasopressin Volume 31.7 -- 31.7 -- -- --    Norepinephrine Volume 176.4 558.6 735 -- -- --    Epinephrine Volume 170.5 1013.5 1184 283.2 -- 283.2    Volume (mL) (NS infusion) 98.8 -- 98.8 -- -- --    IV Piggyback  120.8  -- 120.8  50  -- 50    Volume (mL) (vancomycin (VANCOCIN) 1,750 mg in  mL IVPB) 120.8 -- 120.8 -- -- --    Volume (mL) (DAPTOmycin (CUBICIN) 750 mg in NS 50 mL IVPB) -- -- -- 50 -- 50    Total Intake 598.1 1572.1 2170.2 333.2 -- 333.2       Output    Urine  20  32  52  5  -- 5    Output (mL) (Urethral Catheter Temperature probe) 20 32 52 5 -- 5    Stool  --  -- --  --  -- --    Number of Times Stooled 0 x -- 0 x -- -- --    Total Output 20 32 52 5 -- 5       Net I/O     578.1 1540.1 2118.2 328.2 -- 328.2            Intake/Output Summary (Last 24 hours) at 8/3/2022 1143  Last data filed at 8/3/2022 1000  Gross per 24 hour   Intake 2303.89 ml   Output 37 ml   Net 2266.89 ml            • DAPTOmycin  750 mg Q24HRS   • norepinephrine (Levophed) infusion  0-30 mcg/min Continuous   • insulin regular  1-6 Units Q6HRS    And   • dextrose bolus  25 g Q15 MIN PRN   • Pharmacy  1 Each PHARMACY TO DOSE   • senna-docusate  2 Tablet BID    And   • polyethylene glycol/lytes  1 Packet QDAY PRN    And   • magnesium hydroxide  30 mL QDAY PRN    And   • bisacodyl  10 mg QDAY PRN   • acetaminophen  650 mg Q4HRS PRN   • oxyCODONE immediate-release  5 mg Q4HRS PRN   • EPINEPHrine (Adrenalin) infusion  0-0.2 mcg/kg/min Continuous   • budesonide-formoterol  2 Puff BID (RT)       Assessment and Plan:  Hospital day #3 Thoracic LEVAR  Prophylactic anticoagulation: no         Start date/time: tbd      MAP >70  Cardiac thrombus/vegitation/ HF- EF 25%  ABX  Arrest/unstable- poor surgical candidate- updated Dr Wagner

## 2022-08-03 NOTE — ASSESSMENT & PLAN NOTE
VTach in nature 8/3 multiple shock delivered  Likely driven by force map goal and epinephrine with underlying severely reduced EF and cardiac ischemic disease  Changed map goal to 60-65  DNR/DNI  Palliative approach recommended    8/4 had repeat Vfib arrest and passed quickly

## 2022-08-03 NOTE — PROGRESS NOTES
1632: RN enter room pt went into sustain V-tach, art line showing flat wave form, Code blue button pressed and CPR started. See code blue narration for details.

## 2022-08-03 NOTE — PROGRESS NOTES
"CARDIOLOGY INPATIENT FOLLOW UP NOTE:    Impressions:    # septic shock  # MRSA bacteremia  # epidural abscess - not surgical candidate per NSG   # AV endocarditis with moderate AR  # Layered LV apical thrombus  # Ischemic Cardiomyopathy  # Acute on ? chronic systolic heart failure  # Elevated troponin T in the 60's  # JOANA  # Hyponatremia  # Lactic acidosis with metabolic acidosis  # Encephalopathy  # PEA cardiac arrest 8/2/22: rosc 1 min after CPR  # SVT with baseline RBBB 8/2/22    Recommendations:    Primary team and family agreed no escalation of care, stable HR and BP on current regimen. Not on comfort care measure as of yet: in progress.    - Target MAP 60-55mmHg, consider weaning off epi and use norepi instead  - consider IV diuresis to target at least net -1L/24 hours if possible  - ongoing antibiotics  - Full dose AC and / or Aspirin 81mg once deemed safe by NSG.   - No clear benefit for MARISA at this time unless ID believes it will add value to his workup and prognosis. Does not appear to be a surgical candidate at this time  - agree with comfort care discussions    We will sign off.    SUBJECTIVE/INTERIM EVENTS:  Continues to be encephalopathic    PEA cardiac arrest 8/2/22    Off levo, on epi drip, MAPs 60-65mmHg. On more oxygen requirements    Daughter at bedside    EXAM:  BP (!) 73/49   Pulse 93   Temp 36 °C (96.8 °F) (Temporal)   Resp 20   Ht 1.778 m (5' 10\")   Wt 94.4 kg (208 lb 1.8 oz)   SpO2 97%   BMI 29.86 kg/m²   Gen: ill appearing, encephalopathic  HEENT: Symmetric face. Anicteric sclerae. Moist mucus membranes  NECK: + JVD. No lymphadenopathy  CARDIAC: Normal PMI, regular, normal S1, S2. with a diastolic murmur   RESP: Clear to auscultation bilaterally  SKIN: warmer extremities, significant LUCIANO     Studies  Lab Results   Component Value Date/Time    SODIUM 130 (L) 08/03/2022 05:32 AM    POTASSIUM 5.1 08/03/2022 05:32 AM    CHLORIDE 100 08/03/2022 05:32 AM    CO2 15 (L) 08/03/2022 05:32 AM    " GLUCOSE 189 (H) 08/03/2022 05:32 AM    BUN 80 (H) 08/03/2022 05:32 AM    CREATININE 2.20 (H) 08/03/2022 05:32 AM       Medical records reviewed for this encounter    For this encounter I directly reviewed ECG tracings, Echo images and Telemetry tracings      Current Facility-Administered Medications:   •  DAPTOmycin (CUBICIN) 750 mg in NS 50 mL IVPB, 750 mg, Intravenous, Q24HRS, Yara Kerr M.D.  •  norepinephrine (Levophed) 8 mg in 250 mL NS infusion (premix), 0-30 mcg/min, Intravenous, Continuous, Julia Walker M.D., Stopped at 08/03/22 0400  •  insulin regular (HumuLIN R,NovoLIN R) injection, 1-6 Units, Subcutaneous, Q6HRS, 1 Units at 08/03/22 0544 **AND** POC blood glucose manual result, , , Q6H **AND** NOTIFY MD and PharmD, , , Once **AND** Administer 20 grams of glucose (approximately 8 ounces of fruit juice) every 15 minutes PRN FSBG less than 70 mg/dL, , , PRN **AND** dextrose 50% (D50W) injection 25 g, 25 g, Intravenous, Q15 MIN PRN, Julia Walker M.D.  •  Pharmacy Consult: Enteral tube insertion - review meds/change route/product selection, 1 Each, Other, PHARMACY TO DOSE, Pro Ballesteros M.D.  •  senna-docusate (PERICOLACE or SENOKOT S) 8.6-50 MG per tablet 2 Tablet, 2 Tablet, Enteral Tube, BID **AND** polyethylene glycol/lytes (MIRALAX) PACKET 1 Packet, 1 Packet, Enteral Tube, QDAY PRN **AND** magnesium hydroxide (MILK OF MAGNESIA) suspension 30 mL, 30 mL, Enteral Tube, QDAY PRN **AND** bisacodyl (DULCOLAX) suppository 10 mg, 10 mg, Rectal, QDAY PRN, Pro Ballesteros M.D.  •  acetaminophen (Tylenol) tablet 650 mg, 650 mg, Enteral Tube, Q4HRS PRN, Pro Ballesteros M.D.  •  lactulose 20 GM/30ML solution 30 mL, 30 mL, Enteral Tube, TID, Pro Ballesteros M.D.  •  midodrine (PROAMATINE) tablet 10 mg, 10 mg, Enteral Tube, TID WITH MEALS, Pro Ballesteros M.D.  •  oxyCODONE immediate-release (ROXICODONE) tablet 5 mg, 5 mg, Enteral Tube, Q4HRS PRN, Pro Ballesteros M.D.  •  pravastatin  (PRAVACHOL) tablet 40 mg, 40 mg, Enteral Tube, Q EVENING, Pro Ballesteros M.D.  •  sodium bicarbonate tablet 650 mg, 650 mg, Enteral Tube, Q8HRS, Pro Ballesteros M.D.  •  EPINEPHrine (Adrenalin) infusion 4 mg/250 mL (premix), 0-0.2 mcg/kg/min, Intravenous, Continuous, Julia Walker M.D., Last Rate: 70.8 mL/hr at 08/03/22 0616, 0.2 mcg/kg/min at 08/03/22 0616  •  budesonide-formoterol (SYMBICORT) 80-4.5 MCG/ACT inhaler 2 Puff, 2 Puff, Inhalation, BID (RT), Santos Banda D.O.    Case discussed with Dr. Ballesteros and bedside RN Kyrie.    Godwin Albright MD, MPH [facility]  Interventional Cardiologist  [facility]   of Clinical Internal Medicine - [facility] Krystian TAMERA

## 2022-08-03 NOTE — DISCHARGE PLANNING
Case Management Discharge Planning    Admission Date: 8/1/2022  GMLOS: 4.8  ALOS: 2    6-Clicks ADL Score:    6-Clicks Mobility Score:        Anticipated Discharge Dispo: Discharge Disposition: Still a Patient (30)    DME Needed: Unable to determine at this time.     Action(s) Taken: Updated Provider/Nurse on Discharge Plan and OTHER: Chart reviewed and discharge plan updated. Discussed patients’ treatment and discharge plans with medical and nursing teams during IDT rounds.     Escalations Completed: None    Medically Clear: No    Next Steps: f/u with medical and nursing teams regarding discharge planning needs/barriers and assist as indicated. f/u with family regarding discharge planning needs/barriers and update discharge plan as indicated.    Barriers to Discharge: Medical clearance    Is the patient up for discharge tomorrow: No

## 2022-08-03 NOTE — PROGRESS NOTES
Overnight progress note  Was updated by Dr. Palomino on patient's code earlier along with discussion with family.     Family arrived at bedside at 10:30-discussed with his 2 daughters Ashley Alexandra and his sister along with few other family members at bedside.   We discussed all of patient's complex medical problems and history, including epidural abscess causing impingement and concern for paralysis, MRSA bacteremia, and endocarditis which is worsened his heart failure with a EF of 20% and aortic regurg.  Discussed that we had been trying to keep MAP elevated for blood flow through to the spine, however that his heart function was not able to tolerate this even with lower MAP discussed earlier today.  Also discussed patient with JOANA and worsening acidosis, that bicarb drip would worsen respiratory function and heart function with fluid overload, did have oral bicarb ordered however patient unable to take p.o..  Discussed option for NG tube to give p.o. medications.    Family all agreed with patient being DNR/DNI as discussed previously.  Also stated they would not want NG tube at this time as they will discuss if doing further care which may cause discomfort, is within his goals of care.   Also agreed with maintaining a lower map in order to help his heart understanding the risk to his spine, and that with everything worsening patient not going to be a surgical candidate.     Discussed that we could continue with antibiotics, pressors and other medications to see if we can help get him through but his prognosis is extremely poor, and his quality of life will likely be very decreased if he makes it through this, including the very real possibility of paralysis.  Family wants a few minutes to discuss with themselves if they want to continue medical care, or if they would like to transition to comfort only measures.     Checked back in with family an hour and a half later, still admit not made a decision having difficulty  choosing to stop medical care and transition to comfort.  Patient is lying in bed appears comfortable no distress but not waking up.  Discussed we can continue medical care at this time however with patient's poor prognosis worsening acidosis and mental status high likelihood for decompensation in the next hours to days.  Family showed understanding, would let nurse know if after further discussion they wanted to transition to comfort care tonight.     Total critical care time:42

## 2022-08-04 NOTE — PROGRESS NOTES
Critical Care Progress Note    Date of admission  8/1/2022    Chief Complaint  61 y.o. male admitted 8/1/2022 with hx of HFrEF found to be 20-25%, ventricular thrombus on eliquis, pulmonary fibrosis on home oxygen, found to have epidural abscess T2-T9 at [facility] and transfer here for neurosurgical consultation was admitted to floor and transferred to ICU for acute management and BP augementation. Has a hx of prior spinal infection in 2019. Cardiology consulted s/p stat echo found to have endocarditis and vegetation on aortic valve with moderate AR.     Hospital Course  Admitted to ICU 8/1 for epidural abscess, sepsis and map augmentation  8/2 multidisciplinary discussion with cardiology, neurosurgery and CCM we decided to lower map goal to 70, worsening mental status and multiple organ failure, ID consultation, suffer cardiac arrest changed to DNR/DNI    8/3 discussing with family comfort measures would like to see if patient can communicate prior to moving towards comfort, failed lasix trial    Interval Problem Update  Reviewed last 24 hour events:  Lasix 100mg and Diuril 500mg IV   Net + 1.4L last 24hrs and 4.2L for hospital stay  Urine only 137 ml in last 24hrs  Worsening renal failure  Family updated at bedside    Patient suffered Vfib arrest during rounds DNR/DNI and passed quickly  Morphine given  Family was called back to room and given condulences    Review of Systems  Review of Systems   Unable to perform ROS: Medical condition        Vital Signs for last 24 hours   Pulse:  [] 93  Resp:  [11-31] 28  BP: ()/(51-60) 96/54  SpO2:  [89 %-100 %] 97 %    Hemodynamic parameters for last 24 hours       Respiratory Information for the last 24 hours       Physical Exam   Physical Exam  Vitals and nursing note reviewed.   Constitutional:       General: He is not in acute distress.     Appearance: He is ill-appearing.      Comments: Sitting up in bed chronically ill appearing, with family at bedside    HENT:      Head: Normocephalic.      Mouth/Throat:      Mouth: Mucous membranes are moist.   Eyes:      Pupils: Pupils are equal, round, and reactive to light.   Cardiovascular:      Rate and Rhythm: Tachycardia present. Rhythm irregular.      Heart sounds: No murmur heard.  Pulmonary:      Effort: No respiratory distress.      Breath sounds: No stridor. Rales present. No wheezing or rhonchi.      Comments: Rales with poor cough and secretions  Abdominal:      General: There is no distension.      Palpations: There is no mass.      Tenderness: There is no abdominal tenderness.      Hernia: No hernia is present.   Musculoskeletal:         General: Swelling present. No tenderness, deformity or signs of injury.      Comments: Pitting edema to sacrum and lower ext   Skin:     Coloration: Skin is pale.      Comments: Improving skin perfusion with only cold toes    Neurological:      Mental Status: He is alert.      Comments: Awakes looking around, lethargic, not following commands, poor cough, moves ext, not following commands         Medications  Current Facility-Administered Medications   Medication Dose Route Frequency Provider Last Rate Last Admin   • DAPTOmycin (CUBICIN) 750 mg in NS 50 mL IVPB  750 mg Intravenous Q24HRS Yara Kerr M.D. 100 mL/hr at 08/04/22 0936 750 mg at 08/04/22 0936   • morphine 4 MG/ML injection 2 mg  2 mg Intravenous Q4HRS PRN Annika Chiu, A.P.R.N.   2 mg at 08/03/22 2128   • morphine 4 MG/ML injection 4 mg  4 mg Intravenous Q4HRS PRN Annika Chiu, A.P.R.N.   4 mg at 08/04/22 0953   • norepinephrine (Levophed) 8 mg in 250 mL NS infusion (premix)  0-30 mcg/min Intravenous Continuous Julia Walker M.D.   Stopped at 08/03/22 0400   • Pharmacy Consult: Enteral tube insertion - review meds/change route/product selection  1 Each Other PHARMACY TO DOSE Pro Ballesteros M.D.       • senna-docusate (PERICOLACE or SENOKOT S) 8.6-50 MG per tablet 2 Tablet  2 Tablet Enteral Tube  BID Pro Ballesteros M.D.        And   • polyethylene glycol/lytes (MIRALAX) PACKET 1 Packet  1 Packet Enteral Tube QDAY PRN Pro Ballesteros M.D.        And   • magnesium hydroxide (MILK OF MAGNESIA) suspension 30 mL  30 mL Enteral Tube QDAY PRN Pro Ballesteros M.D.        And   • bisacodyl (DULCOLAX) suppository 10 mg  10 mg Rectal QDAY PRN Pro Ballesteros M.D.       • acetaminophen (Tylenol) tablet 650 mg  650 mg Enteral Tube Q4HRS PRN Pro Ballesteros M.D.       • oxyCODONE immediate-release (ROXICODONE) tablet 5 mg  5 mg Enteral Tube Q4HRS PRN Pro Ballesteros M.D.       • EPINEPHrine (Adrenalin) infusion 4 mg/250 mL (premix)  0-0.2 mcg/kg/min Intravenous Continuous Julia Walker M.D. 42.5 mL/hr at 08/04/22 0723 0.12 mcg/kg/min at 08/04/22 0723   • budesonide-formoterol (SYMBICORT) 80-4.5 MCG/ACT inhaler 2 Puff  2 Puff Inhalation BID (RT) Santos Banda D.O.           Fluids    Intake/Output Summary (Last 24 hours) at 8/4/2022 1034  Last data filed at 8/4/2022 0852  Gross per 24 hour   Intake 1093.61 ml   Output 277 ml   Net 816.61 ml       Laboratory  Recent Labs     08/01/22  2121 08/02/22  1414   AMVPC78C 7.44  --    PWAMFQ645B 28.2  --    VUOGD653D 83.0  --    EMOO3DNF 95.0  --    ARTHCO3 19  --    ARTBE -4  --    ISTATAPH  --  7.234*   ISTATAPCO2  --  37.9*   ISTATAPO2  --  92*   ISTATATCO2  --  17*   [id number]  --  96   ISTATARTHCO3  --  16.0*   ISTATARTBE  --  -11*   ISTATTEMP  --  96.6 F   ISTATSPEC  --  Arterial   ISTATAPHTC  --  7.249*   [id number]  --  86     Recent Labs     08/03/22  0532   CPKTOTAL 45     Recent Labs     08/01/22  2132 08/02/22  0600 08/02/22  1200 08/03/22  0532 08/04/22  0553   SODIUM 130* 132* 130* 130* 133*   POTASSIUM 3.9 4.5 4.5 5.1 5.3   CHLORIDE 99 98 97 100 102   CO2 16* 15* 13* 15* 12*   BUN 54* 60* 70* 80* 94*   CREATININE 1.26 1.45* 1.81* 2.20* 2.96*   MAGNESIUM 2.0 2.3  --   --   --    CALCIUM 7.3* 7.4* 7.2* 6.8* 6.8*     Recent Labs      08/01/22 2132 08/02/22 0600 08/02/22  1200 08/03/22  0532 08/04/22  0553   ALTSGPT 14 19  --   --   --    ASTSGOT 33 44  --   --   --    ALKPHOSPHAT 101* 106*  --   --   --    TBILIRUBIN 1.6* 1.7*  --   --   --    GLUCOSE 104* 142* 170* 189* 206*     Recent Labs     08/01/22 2132 08/02/22 0600 08/02/22  1520 08/03/22  0532 08/04/22  0553   WBC  --   --  23.7* 21.0* 19.1*   NEUTSPOLYS  --   --  92.10* 91.50* 92.10*   LYMPHOCYTES  --   --  1.70* 2.50* 2.60*   MONOCYTES  --   --  3.10 3.90 3.10   EOSINOPHILS  --   --  0.10 0.10 0.20   BASOPHILS  --   --  0.70 0.60 0.40   ASTSGOT 33 44  --   --   --    ALTSGPT 14 19  --   --   --    ALKPHOSPHAT 101* 106*  --   --   --    TBILIRUBIN 1.6* 1.7*  --   --   --      Recent Labs     08/01/22 2132 08/02/22 0720 08/02/22 1520 08/03/22  0532 08/04/22  0553   RBC  --   --  4.80 4.94 4.91   HEMOGLOBIN  --   --  13.2* 13.4* 13.4*   HEMATOCRIT  --   --  39.5* 40.9* 40.0*   PLATELETCT  --   --  194 171 141*   PROTHROMBTM 34.5* 34.5*  --   --   --    APTT 36.3*  --   --   --   --    INR 3.60* 3.59*  --   --   --        Imaging  X-Ray:  I have personally reviewed the images and compared with prior images.  CT:    Reviewed  Echo:   Reviewed  MRI:   Reviewed    Assessment/Plan  * Abscess in epidural space of thoracic spine- (present on admission)  Assessment & Plan  Neurosurgery consulting patient is poor operative candidate  Medical management   ID consulting  Serial neurologic exam for worsening spinal cord compression  Map goal of > 70 after discussing with NSG, cardiology and to maximize perfusion to spinal cord with his poor LV and Mod AR  No IR intervention    Patient suffered cardiac arrest 8/2   Thus map goal changes to 60-65  Prognosis poor with no source control and patient would not tolerate surgery        Hypoxemia  Assessment & Plan  Acute on chronic due to pulmonary fibrosis, volume overload due to sepsis and moderate AR and EF of 20%  Trial of force diuresis  If  patient was to worsen from respiratory mechanics would recommend palliative approached     Cardiac arrest (HCC)  Assessment & Plan  VTach in nature 8/3 multiple shock delivered  Likely driven by force map goal and epinephrine with underlying severely reduced EF and cardiac ischemic disease  Changed map goal to 60-65  DNR/DNI  Palliative approach recommended    8/4 had repeat Vfib arrest and passed quickly    Goals of care, counseling/discussion  Assessment & Plan  Called spoke with family 8/2 patient has a very concerning diagnosis and multiple comorbid conditions and unlikely to survive hospital discharge  Recommended against CPR and short intubation limit trial. Ashley daughter appreciate discussion and will discuss with family about patient wishes since he is unable to help us at this time with his encephalopathy    Palliative care consulted   Continue good communication with family and update them on patient trajectory  Patient suffered cardiac arrest 8/2    Spoke again with family at bedside she was going to start comfort care this morning but with him awakening she would like to see if she could have a chance for him to awake more. She did agree if patient worsening from respiratory or uncomfortable will transition to comfort measures as she doesn't want him to suffer.     8/4 spoke with daughter some family coming later today    Pleural effusion, bilateral  Assessment & Plan  Likely chronic due to heart failure  Monitor need for diagnostic and or drainage    JOANA (acute kidney injury) ([facility])  Assessment & Plan  Maintain euvolemia and monitor fluid responsiveness (avoid NaCL and renal congestion)  MAP > 70 uses pressors or inotropic trial  Monitor urine output and I&O's  Avoid and review nephrotoxin medication  Rule out post obstruction -> ct abdomen negative  Consider renal U/S if no renal images  U/a and CPK  Worsening  Trial of lasix 8/3  Worsening will try high dose of lasix and diuril today 8/4    Cirrhosis  of liver with ascites (HCC)  Assessment & Plan  T Bili 1.7, INR 3.5 with ascites on ct scan  Unknown alcohol hx  Check hepatitis panel -> negative  Avoid hepatoxins  lactulose-> unable to safely swallow and family request no cortrac  Possible cardiac in etiology    Encephalopathy  Assessment & Plan  Metabolic, septic and cardiogenic in nature  Limit sedative  Aspiration precautions      Acute bacterial endocarditis  Assessment & Plan  Vegetation seen on AV  MRSA bacteremia  ID consultation  Serial blood cx      Heart failure with reduced ejection fraction (HCC)  Assessment & Plan  EF 20% with asymmetric wall motions likely ischemic disease  Mod AR  Continue inotrope w/ epinephrine to support MAP and maintain HR to 's and to prevent regurgitation of AR   Limit pure afterload agents and reduce map or go off SBP goal 100-120's  Use norepinephrine for map goal in addition to epi as above  Try to limit fluids and is already volume overloaded with ascites and pleural effusions  Suffered VT arrest 8/2   Patient with map goal changed his extremities has had improved warmth    Prognosis poor palliative care consultation  Not a cardiac cath candidate with his potential surgical needs  Trial of force diuresis    Mural thrombus of heart- (present on admission)  Assessment & Plan  Chronic in nature holding anticoagulation with potential need for operative intervention  Restart when able  INR 3.5 with stable TEG likely related to liver disease  Eliquis was not reversed on admission    Septic shock (HCC)- (present on admission)  Assessment & Plan  This is Septic shock Present on admission  SIRS criteria identified on my evaluation include: Tachycardia, with heart rate greater than 90 BPM and Leukocytosis, with WBC greater than 12,000  Indicators of septic shock include: Severe sepsis present and persistent hypotension after 30 ml/kg completed.   Sources is: bacteremia, epidural abscess and endocarditis  Sepsis protocol  initiated  Crystalloid Fluid Administration: Patient has advanced or end-stage heart failure (with documentation of NYHA class III or symptoms with minimal exertion, Or NYHA class IV or symptoms at rest or with activity), for this/these reason(s) it is unsafe for this patient to receive 30 mL/kg of fluid  Partial Fluid Dose Given: 10 mL/kg per current or ideal body weight, patient to be reassessed shortly after completion of partial fluid bolus to ensure adequacy of resuscitation  IV antibiotics as appropriate for source of sepsis  Reassessment: I have reassessed the patient's hemodynamic status    ID consultation   Poor surgical candidate thus source control will be difficult      T2DM (type 2 diabetes mellitus) (HCC)- (present on admission)  Assessment & Plan  Tight glucose control to help promote infection control  Sliding scale coverage with hypoglycemic protocol  HgA1C    Pulmonary fibrosis (HCC)- (present on admission)  Assessment & Plan  Hx of which make even harder due to his poor pulmonary reserve  Careful with volume expansion  Monitor need for mechanical support with Bipap or Intubation       VTE:  Contraindicated and SCD's  Ulcer: Not Indicated  Lines: Central Line  Ongoing indication addressed, Arterial Line  Ongoing indication addressed and Rodriguez Catheter  Ongoing indication addressed    I have performed a physical exam and reviewed and updated ROS and Plan today (8/4/2022). In review of yesterday's note (8/3/2022), there are no changes except as documented above.     Discussed patient condition and risk of morbidity and/or mortality with Family, RN, RT, Pharmacy, Code status disscussed, Charge nurse / hot rounds and Patient    The patient remains critically ill on epinephrine gtt and high oxygen levels and family discussions.  Critical care time = 38 minutes in directly providing and coordinating critical care and extensive data review.  No time overlap and excludes procedures.

## 2022-08-04 NOTE — PROGRESS NOTES
Patient suffered vfib arrest suddenly and is DNR/DNI with plans to move towards comfort  Patient was pronounced by myself at 10:10 am with asystole on monitor confirmed in two leads, no spontaneous respirations, no pulse/heart tones and persistent flat arterial waveform, no neurologic response.     Pro Ballesteros MD  Critical Care Medicine

## 2022-08-04 NOTE — PROGRESS NOTES
Neurosurgery Progress Note    Subjective:  Continued discussions about comfort care    Exam:  EO spont- nonverbal  Does not follow- Min WD in BLE/Spont UE    BP  Min: 83/60  Max: 104/51  Pulse  Av.7  Min: 80  Max: 111  Resp  Av.8  Min: 11  Max: 31  Monitored Temp 2  Av.3 °C (99.1 °F)  Min: 36.9 °C (98.42 °F)  Max: 37.5 °C (99.5 °F)  SpO2  Av.2 %  Min: 89 %  Max: 100 %    No data recorded    Recent Labs     22  1520 22  0532 22  0553   WBC 23.7* 21.0* 19.1*   RBC 4.80 4.94 4.91   HEMOGLOBIN 13.2* 13.4* 13.4*   HEMATOCRIT 39.5* 40.9* 40.0*   MCV 82.3 82.8 81.5   MCH 27.5 27.1 27.3   MCHC 33.4* 32.8* 33.5*   RDW 54.2* 54.5* 53.8*   PLATELETCT 194 171 141*   MPV 9.9 10.3 10.8     Recent Labs     22  1200 22  0532 22  0553   SODIUM 130* 130* 133*   POTASSIUM 4.5 5.1 5.3   CHLORIDE 97 100 102   CO2 13* 15* 12*   GLUCOSE 170* 189* 206*   BUN 70* 80* 94*   CREATININE 1.81* 2.20* 2.96*   CALCIUM 7.2* 6.8* 6.8*     Recent Labs     22  2132 22  0720   APTT 36.3*  --    INR 3.60* 3.59*     Recent Labs     22  0200   REACTMIN 7.2   CLOTKINET 1.1   CLOTANGL 75.1   MAXCLOTS 64.8   PRCINADP 67.9*   PRCINAA 48.3*       Intake/Output                       22 - 22 0659 22 - 22 0659     [id number] Total  Total                 Intake    I.V.  703.5  537.9 1241.3  --  -- --    Epinephrine Volume 703.5 537.9 1241.3 -- -- --    IV Piggyback  50  -- 50  --  -- --    Volume (mL) (DAPTOmycin (CUBICIN) 750 mg in NS 50 mL IVPB) 50 -- 50 -- -- --    Total Intake 753.5 537.9 1291.3 -- -- --       Output    Urine  75  180 255  --  -- --    Output (mL) (Urethral Catheter Temperature probe) 75 180 255 -- -- --    Total Output 75 180 255 -- -- --       Net I/O     678.5 357.9 1036.3 -- -- --            Intake/Output Summary (Last 24 hours) at 2022 0922  Last data filed at 2022 0600  Gross per 24 hour   Intake  1099.74 ml   Output 250 ml   Net 849.74 ml            • DAPTOmycin  750 mg Q24HRS   • morphine injection  2 mg Q4HRS PRN   • morphine injection  4 mg Q4HRS PRN   • norepinephrine (Levophed) infusion  0-30 mcg/min Continuous   • Pharmacy  1 Each PHARMACY TO DOSE   • senna-docusate  2 Tablet BID    And   • polyethylene glycol/lytes  1 Packet QDAY PRN    And   • magnesium hydroxide  30 mL QDAY PRN    And   • bisacodyl  10 mg QDAY PRN   • acetaminophen  650 mg Q4HRS PRN   • oxyCODONE immediate-release  5 mg Q4HRS PRN   • EPINEPHrine (Adrenalin) infusion  0-0.2 mcg/kg/min Continuous   • budesonide-formoterol  2 Puff BID (RT)       Assessment and Plan:  Hospital day #4 Thoracic LEVAR  Prophylactic anticoagulation: no         Start date/time: tbd      Cardiac thrombus/vegitation/ HF- EF 25%  ABX  Arrest/unstable- poor surgical candidate- updated Dr Wagner